# Patient Record
Sex: MALE | Race: WHITE | NOT HISPANIC OR LATINO | URBAN - METROPOLITAN AREA
[De-identification: names, ages, dates, MRNs, and addresses within clinical notes are randomized per-mention and may not be internally consistent; named-entity substitution may affect disease eponyms.]

---

## 2018-06-07 ENCOUNTER — INPATIENT (INPATIENT)
Facility: HOSPITAL | Age: 68
LOS: 7 days | Discharge: HOME | DRG: 698 | End: 2018-06-15
Attending: INTERNAL MEDICINE | Admitting: INTERNAL MEDICINE
Payer: MEDICARE

## 2018-06-07 VITALS
RESPIRATION RATE: 20 BRPM | WEIGHT: 315 LBS | HEART RATE: 75 BPM | DIASTOLIC BLOOD PRESSURE: 68 MMHG | SYSTOLIC BLOOD PRESSURE: 192 MMHG | HEIGHT: 74 IN | OXYGEN SATURATION: 92 % | TEMPERATURE: 98 F

## 2018-06-07 DIAGNOSIS — E87.70 FLUID OVERLOAD, UNSPECIFIED: ICD-10-CM

## 2018-06-07 DIAGNOSIS — E11.618 TYPE 2 DIABETES MELLITUS WITH OTHER DIABETIC ARTHROPATHY: ICD-10-CM

## 2018-06-07 DIAGNOSIS — N17.9 ACUTE KIDNEY FAILURE, UNSPECIFIED: ICD-10-CM

## 2018-06-07 DIAGNOSIS — N40.0 BENIGN PROSTATIC HYPERPLASIA WITHOUT LOWER URINARY TRACT SYMPTOMS: ICD-10-CM

## 2018-06-07 DIAGNOSIS — E87.5 HYPERKALEMIA: ICD-10-CM

## 2018-06-07 DIAGNOSIS — I15.0 RENOVASCULAR HYPERTENSION: ICD-10-CM

## 2018-06-07 DIAGNOSIS — E11.40 TYPE 2 DIABETES MELLITUS WITH DIABETIC NEUROPATHY, UNSPECIFIED: ICD-10-CM

## 2018-06-07 DIAGNOSIS — N39.0 URINARY TRACT INFECTION, SITE NOT SPECIFIED: ICD-10-CM

## 2018-06-07 DIAGNOSIS — E78.2 MIXED HYPERLIPIDEMIA: ICD-10-CM

## 2018-06-07 LAB
ALBUMIN SERPL ELPH-MCNC: 3.2 G/DL — LOW (ref 3.3–5)
ALP SERPL-CCNC: 94 U/L — SIGNIFICANT CHANGE UP (ref 40–120)
ALT FLD-CCNC: 22 U/L — SIGNIFICANT CHANGE UP (ref 12–78)
ANION GAP SERPL CALC-SCNC: 10 MMOL/L — SIGNIFICANT CHANGE UP (ref 5–17)
APPEARANCE UR: CLEAR — SIGNIFICANT CHANGE UP
APTT BLD: 35.8 SEC — SIGNIFICANT CHANGE UP (ref 27.5–37.4)
AST SERPL-CCNC: 21 U/L — SIGNIFICANT CHANGE UP (ref 15–37)
BASOPHILS # BLD AUTO: 0.04 K/UL — SIGNIFICANT CHANGE UP (ref 0–0.2)
BASOPHILS NFR BLD AUTO: 0.8 % — SIGNIFICANT CHANGE UP (ref 0–2)
BILIRUB SERPL-MCNC: 0.5 MG/DL — SIGNIFICANT CHANGE UP (ref 0.2–1.2)
BILIRUB UR-MCNC: NEGATIVE — SIGNIFICANT CHANGE UP
BUN SERPL-MCNC: 60 MG/DL — HIGH (ref 7–23)
CALCIUM SERPL-MCNC: 8.6 MG/DL — SIGNIFICANT CHANGE UP (ref 8.5–10.1)
CHLORIDE SERPL-SCNC: 115 MMOL/L — HIGH (ref 96–108)
CK MB CFR SERPL CALC: 4.8 NG/ML — HIGH (ref 0–3.6)
CO2 SERPL-SCNC: 18 MMOL/L — LOW (ref 22–31)
COLOR SPEC: YELLOW — SIGNIFICANT CHANGE UP
CREAT SERPL-MCNC: 3.1 MG/DL — HIGH (ref 0.5–1.3)
DIFF PNL FLD: NEGATIVE — SIGNIFICANT CHANGE UP
EOSINOPHIL # BLD AUTO: 0.08 K/UL — SIGNIFICANT CHANGE UP (ref 0–0.5)
EOSINOPHIL NFR BLD AUTO: 1.5 % — SIGNIFICANT CHANGE UP (ref 0–6)
GLUCOSE SERPL-MCNC: 83 MG/DL — SIGNIFICANT CHANGE UP (ref 70–99)
GLUCOSE UR QL: NEGATIVE — SIGNIFICANT CHANGE UP
HCT VFR BLD CALC: 41.3 % — SIGNIFICANT CHANGE UP (ref 39–50)
HGB BLD-MCNC: 13.1 G/DL — SIGNIFICANT CHANGE UP (ref 13–17)
IMM GRANULOCYTES NFR BLD AUTO: 0.4 % — SIGNIFICANT CHANGE UP (ref 0–1.5)
INR BLD: 1.32 RATIO — HIGH (ref 0.88–1.16)
KETONES UR-MCNC: NEGATIVE — SIGNIFICANT CHANGE UP
LEUKOCYTE ESTERASE UR-ACNC: ABNORMAL
LYMPHOCYTES # BLD AUTO: 0.49 K/UL — LOW (ref 1–3.3)
LYMPHOCYTES # BLD AUTO: 9.2 % — LOW (ref 13–44)
MCHC RBC-ENTMCNC: 29.2 PG — SIGNIFICANT CHANGE UP (ref 27–34)
MCHC RBC-ENTMCNC: 31.7 GM/DL — LOW (ref 32–36)
MCV RBC AUTO: 92 FL — SIGNIFICANT CHANGE UP (ref 80–100)
MONOCYTES # BLD AUTO: 0.42 K/UL — SIGNIFICANT CHANGE UP (ref 0–0.9)
MONOCYTES NFR BLD AUTO: 7.9 % — SIGNIFICANT CHANGE UP (ref 2–14)
NEUTROPHILS # BLD AUTO: 4.25 K/UL — SIGNIFICANT CHANGE UP (ref 1.8–7.4)
NEUTROPHILS NFR BLD AUTO: 80.2 % — HIGH (ref 43–77)
NITRITE UR-MCNC: NEGATIVE — SIGNIFICANT CHANGE UP
NT-PROBNP SERPL-SCNC: HIGH PG/ML (ref 0–125)
PH UR: 5 — SIGNIFICANT CHANGE UP (ref 5–8)
PLATELET # BLD AUTO: 212 K/UL — SIGNIFICANT CHANGE UP (ref 150–400)
POTASSIUM SERPL-MCNC: 6.1 MMOL/L — HIGH (ref 3.5–5.3)
POTASSIUM SERPL-SCNC: 6.1 MMOL/L — HIGH (ref 3.5–5.3)
PROT SERPL-MCNC: 7.1 G/DL — SIGNIFICANT CHANGE UP (ref 6–8.3)
PROT UR-MCNC: 25 MG/DL
PROTHROM AB SERPL-ACNC: 14.5 SEC — HIGH (ref 9.8–12.7)
RBC # BLD: 4.49 M/UL — SIGNIFICANT CHANGE UP (ref 4.2–5.8)
RBC # FLD: 15.7 % — HIGH (ref 10.3–14.5)
SODIUM SERPL-SCNC: 143 MMOL/L — SIGNIFICANT CHANGE UP (ref 135–145)
SP GR SPEC: 1.02 — SIGNIFICANT CHANGE UP (ref 1.01–1.02)
TROPONIN I SERPL-MCNC: 0.03 NG/ML — SIGNIFICANT CHANGE UP (ref 0.01–0.04)
UROBILINOGEN FLD QL: NEGATIVE — SIGNIFICANT CHANGE UP
WBC # BLD: 5.3 K/UL — SIGNIFICANT CHANGE UP (ref 3.8–10.5)
WBC # FLD AUTO: 5.3 K/UL — SIGNIFICANT CHANGE UP (ref 3.8–10.5)

## 2018-06-07 PROCEDURE — 99291 CRITICAL CARE FIRST HOUR: CPT

## 2018-06-07 PROCEDURE — 93010 ELECTROCARDIOGRAM REPORT: CPT

## 2018-06-07 PROCEDURE — 71046 X-RAY EXAM CHEST 2 VIEWS: CPT | Mod: 26

## 2018-06-07 RX ORDER — DEXTROSE 50 % IN WATER 50 %
25 SYRINGE (ML) INTRAVENOUS ONCE
Qty: 0 | Refills: 0 | Status: DISCONTINUED | OUTPATIENT
Start: 2018-06-07 | End: 2018-06-15

## 2018-06-07 RX ORDER — SODIUM BICARBONATE 1 MEQ/ML
50 SYRINGE (ML) INTRAVENOUS ONCE
Qty: 0 | Refills: 0 | Status: COMPLETED | OUTPATIENT
Start: 2018-06-07 | End: 2018-06-07

## 2018-06-07 RX ORDER — DEXTROSE 50 % IN WATER 50 %
12.5 SYRINGE (ML) INTRAVENOUS ONCE
Qty: 0 | Refills: 0 | Status: DISCONTINUED | OUTPATIENT
Start: 2018-06-07 | End: 2018-06-15

## 2018-06-07 RX ORDER — AMLODIPINE BESYLATE 2.5 MG/1
1 TABLET ORAL
Qty: 0 | Refills: 0 | COMMUNITY

## 2018-06-07 RX ORDER — INSULIN LISPRO 100/ML
VIAL (ML) SUBCUTANEOUS
Qty: 0 | Refills: 0 | Status: DISCONTINUED | OUTPATIENT
Start: 2018-06-07 | End: 2018-06-15

## 2018-06-07 RX ORDER — CEFAZOLIN SODIUM 1 G
1000 VIAL (EA) INJECTION EVERY 12 HOURS
Qty: 0 | Refills: 0 | Status: DISCONTINUED | OUTPATIENT
Start: 2018-06-08 | End: 2018-06-14

## 2018-06-07 RX ORDER — FUROSEMIDE 40 MG
20 TABLET ORAL DAILY
Qty: 0 | Refills: 0 | Status: DISCONTINUED | OUTPATIENT
Start: 2018-06-07 | End: 2018-06-09

## 2018-06-07 RX ORDER — DEXTROSE 50 % IN WATER 50 %
50 SYRINGE (ML) INTRAVENOUS ONCE
Qty: 0 | Refills: 0 | Status: COMPLETED | OUTPATIENT
Start: 2018-06-07 | End: 2018-06-07

## 2018-06-07 RX ORDER — ENOXAPARIN SODIUM 100 MG/ML
30 INJECTION SUBCUTANEOUS DAILY
Qty: 0 | Refills: 0 | Status: DISCONTINUED | OUTPATIENT
Start: 2018-06-07 | End: 2018-06-07

## 2018-06-07 RX ORDER — INSULIN GLARGINE 100 [IU]/ML
10 INJECTION, SOLUTION SUBCUTANEOUS EVERY MORNING
Qty: 0 | Refills: 0 | Status: DISCONTINUED | OUTPATIENT
Start: 2018-06-08 | End: 2018-06-08

## 2018-06-07 RX ORDER — SODIUM CHLORIDE 9 MG/ML
1000 INJECTION, SOLUTION INTRAVENOUS
Qty: 0 | Refills: 0 | Status: DISCONTINUED | OUTPATIENT
Start: 2018-06-07 | End: 2018-06-15

## 2018-06-07 RX ORDER — GLUCAGON INJECTION, SOLUTION 0.5 MG/.1ML
1 INJECTION, SOLUTION SUBCUTANEOUS ONCE
Qty: 0 | Refills: 0 | Status: DISCONTINUED | OUTPATIENT
Start: 2018-06-07 | End: 2018-06-15

## 2018-06-07 RX ORDER — CALCIUM GLUCONATE 100 MG/ML
1 VIAL (ML) INTRAVENOUS ONCE
Qty: 0 | Refills: 0 | Status: COMPLETED | OUTPATIENT
Start: 2018-06-07 | End: 2018-06-07

## 2018-06-07 RX ORDER — ACETAMINOPHEN 500 MG
650 TABLET ORAL EVERY 6 HOURS
Qty: 0 | Refills: 0 | Status: DISCONTINUED | OUTPATIENT
Start: 2018-06-07 | End: 2018-06-15

## 2018-06-07 RX ORDER — DEXTROSE 50 % IN WATER 50 %
15 SYRINGE (ML) INTRAVENOUS ONCE
Qty: 0 | Refills: 0 | Status: DISCONTINUED | OUTPATIENT
Start: 2018-06-07 | End: 2018-06-15

## 2018-06-07 RX ORDER — INSULIN HUMAN 100 [IU]/ML
5 INJECTION, SOLUTION SUBCUTANEOUS ONCE
Qty: 0 | Refills: 0 | Status: COMPLETED | OUTPATIENT
Start: 2018-06-07 | End: 2018-06-07

## 2018-06-07 RX ORDER — SODIUM CHLORIDE 9 MG/ML
1000 INJECTION INTRAMUSCULAR; INTRAVENOUS; SUBCUTANEOUS
Qty: 0 | Refills: 0 | Status: DISCONTINUED | OUTPATIENT
Start: 2018-06-07 | End: 2018-06-08

## 2018-06-07 RX ORDER — CEFAZOLIN SODIUM 1 G
1000 VIAL (EA) INJECTION ONCE
Qty: 0 | Refills: 0 | Status: COMPLETED | OUTPATIENT
Start: 2018-06-07 | End: 2018-06-07

## 2018-06-07 RX ORDER — ENOXAPARIN SODIUM 100 MG/ML
40 INJECTION SUBCUTANEOUS DAILY
Qty: 0 | Refills: 0 | Status: DISCONTINUED | OUTPATIENT
Start: 2018-06-07 | End: 2018-06-15

## 2018-06-07 RX ADMIN — Medication 50 MILLIEQUIVALENT(S): at 21:42

## 2018-06-07 RX ADMIN — Medication 20 MILLIGRAM(S): at 23:12

## 2018-06-07 RX ADMIN — Medication 100 MILLIGRAM(S): at 23:10

## 2018-06-07 RX ADMIN — INSULIN HUMAN 5 UNIT(S): 100 INJECTION, SOLUTION SUBCUTANEOUS at 21:42

## 2018-06-07 RX ADMIN — Medication 200 GRAM(S): at 21:41

## 2018-06-07 RX ADMIN — Medication 50 MILLILITER(S): at 21:42

## 2018-06-07 NOTE — ED PROVIDER NOTE - NS ED ROS FT
GEN: no fever, no chills, no weakness  HENT: no eye pain, no visual changes, no ear pain, no visual or hearing changes, no sore throat, no swelling or neck pain  CV: no chest pain, no palpitations, no dizziness, +swelling  RESP: +coughing, no sob, no IWOB, +MEHTA  GI: no abd pain, no distension, no nausea, no vomiting, no diarrhea, no constipation  : no dysuria,  no frequency, no hematuria, no discharge, no flank pain, +inability to urinate  MUSCULOSKELETAL: no myalgia, no arthralgia, no joint swelling, no bruising   SKIN: no rash, no wounds, no itching  NEURO: no change in mentation, no visual changes, no HA, no focal weakness, no trouble speaking, no gait abnormalities, no dizziness  PSYCH: no suidical ideation, no homicidal ideation, no depression, no anxiety, no hallucinations

## 2018-06-07 NOTE — ED PROVIDER NOTE - OBJECTIVE STATEMENT
69yo M h/o CRI, HTN, DM p/w productive cough w/ white sputum associated w/ bilateral LE swelling, exertional dyspnea and decreased urination x 2-3 days. denies f/c/n/v/d/cp/flank pain. reports angiogram 2yrs prior w/o active disease. no recent travel.

## 2018-06-07 NOTE — ED ADULT NURSE REASSESSMENT NOTE - NS ED NURSE REASSESS COMMENT FT1
bladder scanned, no more than 100 ml present in bladder.  lower extremity edema present, patient states he hasn't had anything to drink since the am

## 2018-06-07 NOTE — H&P ADULT - PROBLEM SELECTOR PROBLEM 8
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

## 2018-06-07 NOTE — H&P ADULT - ASSESSMENT
67 yo m ,  ,plus kids , lives with wife , works from home , has hx of hypertension , osteoarthritis , and hyperlipidemia , and ckd stage 3 , and DM T2, and hx of foot surgery , and chronic edema and lymphedema in legs , and presents with a few weeks of increasing edema and poor dm control and shortness of breath and now with inability to void , and increased weakness,   denies cp , fever, chills , nausea a, vomiting , head aches ,

## 2018-06-07 NOTE — ED PROVIDER NOTE - MEDICAL DECISION MAKING DETAILS
69yo M h/o CRI, HTN, DM p/w productive cough w/ white sputum associated w/ bilateral LE swelling, exertional dyspnea and decreased urination x 2-3 days. denies f/c/n/v/d/cp/flank pain. reports angiogram 2yrs prior w/o active disease. no recent travel. 67yo M h/o CRI, HTN, DM p/w productive cough w/ white sputum associated w/ bilateral LE swelling, exertional dyspnea and decreased urination x 2-3 days. denies f/c/n/v/d/cp/flank pain. reports angiogram 2yrs prior w/o active disease. no recent travel.  EKG unchanged from prior 67yo M h/o CRI, HTN, DM p/w productive cough w/ white sputum associated w/ bilateral LE swelling, exertional dyspnea and decreased urination x 2-3 days. denies f/c/n/v/d/cp/flank pain. reports angiogram 2yrs prior w/o active disease. no recent travel.  EKG unchanged from prior, no peaked T wave,   labs sig for elevated BNP, ARF w/ hyperkalemia, given insulin, d50, ca, bicarb,   pt admitted for further eval and mgmt

## 2018-06-07 NOTE — ED PROVIDER NOTE - CARE PLAN
Principal Discharge DX:	Acute renal failure, unspecified acute renal failure type  Secondary Diagnosis:	Hyperkalemia  Secondary Diagnosis:	Hypervolemia, unspecified hypervolemia type

## 2018-06-07 NOTE — ED PROVIDER NOTE - CRITICAL CARE INDICATION, MLM
patient was critically ill... Patient was critically ill with a high probability of imminent or life threatening deterioration. ARF, fluid overload w/ hyperkalemia

## 2018-06-07 NOTE — ED ADULT NURSE NOTE - CAS EDN DISCHARGE ASSESSMENT
Awake/Alert and oriented to person, place and time/Patient baseline mental status/Dressing clean and dry

## 2018-06-07 NOTE — ED PROVIDER NOTE - PHYSICAL EXAMINATION
GEN: awake, alert, well appearing, NAD   HENT: atraumatic, normocephalic, FRANCISCO, EOMI, no midline instability, oropharynx w/o erythema or exudates, no lymphadenopathy  CV: normal rate and rhythm, S1, S2, no MRG, equal pulses throughout, no JVD, 3+ pitting edema to bilateral LE extending up to lower abdomen  RESP: no distress, no IWOB, no retraction, rhonchi noted bilaterally   ABD: soft, nontender, nondistended, no rebound, no guarding, normoactive bowel sounds, no organomegally  MUSCULOSKELETAL: strenght 5/5 x 4, full range of motion, CMS intact   SKIN: normal color, no turgor, no wounds or rash   NEURO: Awake alert oriented x 3, no facial asymmetry, no slurred speech, no pronator drift, moving all extremities  PSYCH: no suicial ideation, no homicidal ideation, no depression, no anxiety, no hallucination

## 2018-06-07 NOTE — ED PROVIDER NOTE - CRITICAL CARE PROVIDED
direct patient care (not related to procedure)/additional history taking/interpretation of diagnostic studies/documentation

## 2018-06-07 NOTE — ED ADULT NURSE NOTE - OBJECTIVE STATEMENT
patient a/o x 4 with a calm affect c/o generalized swelling, and cough.  patient adds he hasn't been drinking fluids and has not been producing a normal amount of urine.  patient pending results of initial lab work.  placed on continuous cardiac monitoring, RN will continue to monitor patient closely.

## 2018-06-08 DIAGNOSIS — I10 ESSENTIAL (PRIMARY) HYPERTENSION: ICD-10-CM

## 2018-06-08 LAB
ANION GAP SERPL CALC-SCNC: 7 MMOL/L — SIGNIFICANT CHANGE UP (ref 5–17)
BASOPHILS # BLD AUTO: 0.05 K/UL — SIGNIFICANT CHANGE UP (ref 0–0.2)
BASOPHILS NFR BLD AUTO: 1.2 % — SIGNIFICANT CHANGE UP (ref 0–2)
BUN SERPL-MCNC: 60 MG/DL — HIGH (ref 7–23)
CALCIUM SERPL-MCNC: 8.2 MG/DL — LOW (ref 8.5–10.1)
CHLORIDE SERPL-SCNC: 114 MMOL/L — HIGH (ref 96–108)
CK SERPL-CCNC: 176 U/L — SIGNIFICANT CHANGE UP (ref 26–308)
CO2 SERPL-SCNC: 23 MMOL/L — SIGNIFICANT CHANGE UP (ref 22–31)
CREAT SERPL-MCNC: 3.1 MG/DL — HIGH (ref 0.5–1.3)
EOSINOPHIL # BLD AUTO: 0.09 K/UL — SIGNIFICANT CHANGE UP (ref 0–0.5)
EOSINOPHIL NFR BLD AUTO: 2.1 % — SIGNIFICANT CHANGE UP (ref 0–6)
GLUCOSE SERPL-MCNC: 66 MG/DL — LOW (ref 70–99)
HBA1C BLD-MCNC: 5.5 % — SIGNIFICANT CHANGE UP (ref 4–5.6)
HCT VFR BLD CALC: 36.7 % — LOW (ref 39–50)
HGB BLD-MCNC: 11.5 G/DL — LOW (ref 13–17)
IMM GRANULOCYTES NFR BLD AUTO: 0.2 % — SIGNIFICANT CHANGE UP (ref 0–1.5)
LYMPHOCYTES # BLD AUTO: 0.37 K/UL — LOW (ref 1–3.3)
LYMPHOCYTES # BLD AUTO: 8.6 % — LOW (ref 13–44)
MCHC RBC-ENTMCNC: 29 PG — SIGNIFICANT CHANGE UP (ref 27–34)
MCHC RBC-ENTMCNC: 31.3 GM/DL — LOW (ref 32–36)
MCV RBC AUTO: 92.7 FL — SIGNIFICANT CHANGE UP (ref 80–100)
MONOCYTES # BLD AUTO: 0.37 K/UL — SIGNIFICANT CHANGE UP (ref 0–0.9)
MONOCYTES NFR BLD AUTO: 8.6 % — SIGNIFICANT CHANGE UP (ref 2–14)
NEUTROPHILS # BLD AUTO: 3.4 K/UL — SIGNIFICANT CHANGE UP (ref 1.8–7.4)
NEUTROPHILS NFR BLD AUTO: 79.3 % — HIGH (ref 43–77)
NRBC # BLD: 0 /100 WBCS — SIGNIFICANT CHANGE UP (ref 0–0)
PLATELET # BLD AUTO: 214 K/UL — SIGNIFICANT CHANGE UP (ref 150–400)
POTASSIUM SERPL-MCNC: 6 MMOL/L — HIGH (ref 3.5–5.3)
POTASSIUM SERPL-SCNC: 6 MMOL/L — HIGH (ref 3.5–5.3)
RBC # BLD: 3.96 M/UL — LOW (ref 4.2–5.8)
RBC # FLD: 15.7 % — HIGH (ref 10.3–14.5)
SODIUM SERPL-SCNC: 144 MMOL/L — SIGNIFICANT CHANGE UP (ref 135–145)
T3 SERPL-MCNC: 70 NG/DL — LOW (ref 80–200)
T4 AB SER-ACNC: 5.4 UG/DL — SIGNIFICANT CHANGE UP (ref 4.6–12)
TSH SERPL-MCNC: 2.04 UIU/ML — SIGNIFICANT CHANGE UP (ref 0.36–3.74)
WBC # BLD: 4.29 K/UL — SIGNIFICANT CHANGE UP (ref 3.8–10.5)
WBC # FLD AUTO: 4.29 K/UL — SIGNIFICANT CHANGE UP (ref 3.8–10.5)

## 2018-06-08 PROCEDURE — 93010 ELECTROCARDIOGRAM REPORT: CPT

## 2018-06-08 PROCEDURE — 76775 US EXAM ABDO BACK WALL LIM: CPT | Mod: 26

## 2018-06-08 RX ORDER — SODIUM POLYSTYRENE SULFONATE 4.1 MEQ/G
30 POWDER, FOR SUSPENSION ORAL ONCE
Qty: 0 | Refills: 0 | Status: COMPLETED | OUTPATIENT
Start: 2018-06-08 | End: 2018-06-08

## 2018-06-08 RX ORDER — ISOSORBIDE DINITRATE 5 MG/1
40 TABLET ORAL DAILY
Qty: 0 | Refills: 0 | Status: DISCONTINUED | OUTPATIENT
Start: 2018-06-08 | End: 2018-06-08

## 2018-06-08 RX ORDER — AMLODIPINE BESYLATE 2.5 MG/1
5 TABLET ORAL DAILY
Qty: 0 | Refills: 0 | Status: DISCONTINUED | OUTPATIENT
Start: 2018-06-08 | End: 2018-06-08

## 2018-06-08 RX ORDER — ATORVASTATIN CALCIUM 80 MG/1
20 TABLET, FILM COATED ORAL AT BEDTIME
Qty: 0 | Refills: 0 | Status: DISCONTINUED | OUTPATIENT
Start: 2018-06-08 | End: 2018-06-15

## 2018-06-08 RX ORDER — LABETALOL HCL 100 MG
10 TABLET ORAL ONCE
Qty: 0 | Refills: 0 | Status: COMPLETED | OUTPATIENT
Start: 2018-06-08 | End: 2018-06-08

## 2018-06-08 RX ORDER — ASPIRIN/CALCIUM CARB/MAGNESIUM 324 MG
81 TABLET ORAL DAILY
Qty: 0 | Refills: 0 | Status: DISCONTINUED | OUTPATIENT
Start: 2018-06-08 | End: 2018-06-15

## 2018-06-08 RX ORDER — CALCIUM GLUCONATE 100 MG/ML
1 VIAL (ML) INTRAVENOUS ONCE
Qty: 0 | Refills: 0 | Status: DISCONTINUED | OUTPATIENT
Start: 2018-06-08 | End: 2018-06-08

## 2018-06-08 RX ORDER — DEXTROSE 50 % IN WATER 50 %
15 SYRINGE (ML) INTRAVENOUS ONCE
Qty: 0 | Refills: 0 | Status: COMPLETED | OUTPATIENT
Start: 2018-06-08 | End: 2018-06-08

## 2018-06-08 RX ORDER — ISOSORBIDE DINITRATE 5 MG/1
20 TABLET ORAL THREE TIMES A DAY
Qty: 0 | Refills: 0 | Status: DISCONTINUED | OUTPATIENT
Start: 2018-06-08 | End: 2018-06-14

## 2018-06-08 RX ORDER — SODIUM CHLORIDE 9 MG/ML
1000 INJECTION INTRAMUSCULAR; INTRAVENOUS; SUBCUTANEOUS
Qty: 0 | Refills: 0 | Status: DISCONTINUED | OUTPATIENT
Start: 2018-06-08 | End: 2018-06-08

## 2018-06-08 RX ORDER — ATORVASTATIN CALCIUM 80 MG/1
10 TABLET, FILM COATED ORAL AT BEDTIME
Qty: 0 | Refills: 0 | Status: DISCONTINUED | OUTPATIENT
Start: 2018-06-08 | End: 2018-06-08

## 2018-06-08 RX ORDER — HYDRALAZINE HCL 50 MG
50 TABLET ORAL EVERY 6 HOURS
Qty: 0 | Refills: 0 | Status: DISCONTINUED | OUTPATIENT
Start: 2018-06-08 | End: 2018-06-14

## 2018-06-08 RX ORDER — HYDRALAZINE HCL 50 MG
10 TABLET ORAL ONCE
Qty: 0 | Refills: 0 | Status: COMPLETED | OUTPATIENT
Start: 2018-06-08 | End: 2018-06-08

## 2018-06-08 RX ORDER — AMLODIPINE BESYLATE 2.5 MG/1
10 TABLET ORAL DAILY
Qty: 0 | Refills: 0 | Status: DISCONTINUED | OUTPATIENT
Start: 2018-06-08 | End: 2018-06-09

## 2018-06-08 RX ORDER — HYDRALAZINE HCL 50 MG
25 TABLET ORAL ONCE
Qty: 0 | Refills: 0 | Status: COMPLETED | OUTPATIENT
Start: 2018-06-08 | End: 2018-06-08

## 2018-06-08 RX ORDER — HYDRALAZINE HCL 50 MG
25 TABLET ORAL THREE TIMES A DAY
Qty: 0 | Refills: 0 | Status: DISCONTINUED | OUTPATIENT
Start: 2018-06-08 | End: 2018-06-08

## 2018-06-08 RX ADMIN — Medication 10 MILLIGRAM(S): at 20:50

## 2018-06-08 RX ADMIN — SODIUM CHLORIDE 50 MILLILITER(S): 9 INJECTION INTRAMUSCULAR; INTRAVENOUS; SUBCUTANEOUS at 02:29

## 2018-06-08 RX ADMIN — SODIUM POLYSTYRENE SULFONATE 30 GRAM(S): 4.1 POWDER, FOR SUSPENSION ORAL at 13:15

## 2018-06-08 RX ADMIN — Medication 100 MILLIGRAM(S): at 06:08

## 2018-06-08 RX ADMIN — SODIUM POLYSTYRENE SULFONATE 30 GRAM(S): 4.1 POWDER, FOR SUSPENSION ORAL at 17:30

## 2018-06-08 RX ADMIN — Medication 15 GRAM(S): at 07:44

## 2018-06-08 RX ADMIN — Medication 25 MILLIGRAM(S): at 13:10

## 2018-06-08 RX ADMIN — Medication 25 MILLIGRAM(S): at 01:46

## 2018-06-08 RX ADMIN — Medication 20 MILLIGRAM(S): at 06:08

## 2018-06-08 RX ADMIN — Medication 10 MILLIGRAM(S): at 02:28

## 2018-06-08 RX ADMIN — Medication 50 MILLIGRAM(S): at 17:30

## 2018-06-08 RX ADMIN — Medication 50 MILLIGRAM(S): at 23:43

## 2018-06-08 RX ADMIN — ENOXAPARIN SODIUM 40 MILLIGRAM(S): 100 INJECTION SUBCUTANEOUS at 11:16

## 2018-06-08 RX ADMIN — Medication 10 MILLIGRAM(S): at 21:45

## 2018-06-08 RX ADMIN — Medication 25 MILLIGRAM(S): at 06:08

## 2018-06-08 RX ADMIN — ISOSORBIDE DINITRATE 20 MILLIGRAM(S): 5 TABLET ORAL at 23:43

## 2018-06-08 RX ADMIN — AMLODIPINE BESYLATE 10 MILLIGRAM(S): 2.5 TABLET ORAL at 17:00

## 2018-06-08 RX ADMIN — Medication 81 MILLIGRAM(S): at 17:00

## 2018-06-08 RX ADMIN — ATORVASTATIN CALCIUM 20 MILLIGRAM(S): 80 TABLET, FILM COATED ORAL at 21:27

## 2018-06-08 RX ADMIN — Medication 100 MILLIGRAM(S): at 17:58

## 2018-06-08 NOTE — CHART NOTE - NSCHARTNOTEFT_GEN_A_CORE
Called by RN for pt requesting to leave AMA. States he is only getting 25% of his prescribed medications. Was unable to reach his pain management doctor but checked pharmacy and his correct dose is prescribed. Pt states if he does not get more oxycodone and valium he wants to leave. Pt extremely hostile and threatening, stating he will punch Dr. Stern if he sees him tomorrow. Pt advised he can leave AMA now or in am to assure safe discharge. He is currently making arrangements to leave tonight Called by RN for hypertension. Pt seen and evaluated Called by RN for hypertension. BP taken manually was 204/82. Pt seen and evaluated. Pt asymptomatic, denies CP, SOB, lightheadedness, HA, dizziness, vision changes. Cardio consult and earlier notes appreciated.     Vital Signs Last 24 Hrs  T(C): 36.9 (08 Jun 2018 19:54), Max: 36.9 (08 Jun 2018 08:53)  T(F): 98.4 (08 Jun 2018 19:54), Max: 98.4 (08 Jun 2018 08:53)  HR: 88 (08 Jun 2018 19:54) (60 - 88)  BP: 204/82 (08 Jun 2018 20:41) (150/82 - 216/71)  BP(mean): --  RR: 17 (08 Jun 2018 19:54) (17 - 18)  SpO2: 97% (08 Jun 2018 19:54) (93% - 97%)    General: Well developed obese male, well nourished, NAD  HEENT: NCAT, PERRLA, EOMI bl, moist mucous membranes   Neck: Supple, nontender, no mass  Neurology: A&Ox3, nonfocal  Respiratory: CTA B/L, No W/R/R  CV: RRR, +S1/S2, no murmurs, rubs or gallops  Abdominal: Soft, NT, ND +BSx4  Extremities: No C/C/E, + peripheral pulses    68 year old male admitted for chronic edema and lymphedema in legs, presented with increasing edema, poor dm control and shortness of breath, now with elevated BP and sustained irregular heart rhythm  - Pt asymptomatic. Cardio note appreciated, avoid BB due to bradycardia. had already Received amlodipine 10mg PO  - hydralazine 10mg IV push performed  - Call placed to Dr. Washburn (covering Dr. Lo), awaiting call back  - will continue to monitor

## 2018-06-08 NOTE — CONSULT NOTE ADULT - ASSESSMENT
67 yo m ,  ,plus kids , lives with wife , works from home , has hx of hypertension , osteoarthritis , and hyperlipidemia , and ckd stage 3 , and DM T2, and hx of foot surgery , and chronic edema and lymphedema in legs , and presents with a few weeks of increasing edema and poor dm control and shortness of breath and now with inability to void , and increased weakness, now with venkata and ckd

## 2018-06-08 NOTE — CHART NOTE - NSCHARTNOTEFT_GEN_A_CORE
67 yo m pmh hypertension , osteoarthritis, hyperlipidemia , ckd stage 3 , DM II, chronic edema and lymphedema in legs , presented with increasing edema, poor dm control and shortness of breath. Pt was admitted to the floor. Found to be hypertensive systolic 190-200. Pt had taken his bp meds in the am including his losartan, amlodipine and verapamil but had missed his hydralazine that he takes TID due to presenting to the ED. Pt is asymptomatic. all ros negative     Vital Signs Last 24 Hrs  T(C): 36.6 (2018 02:06), Max: 36.9 (2018 18:24)  T(F): 97.8 (2018 02:06), Max: 98.4 (2018 18:24)  HR: 71 (2018 02:06) (71 - 86)  BP: 190/78 (2018 02:06) (174/74 - 192/76)  BP(mean): --  RR: 18 (2018 00:30) (18 - 20)  SpO2: 95% (2018 02:06) (92% - 96%)                          13.1   5.30  )-----------( 212      ( 2018 19:59 )             41.3     2018 19:59    143    |  115    |  60     ----------------------------<  83     6.1     |  18     |  3.10     Ca    8.6        2018 19:59    TPro  7.1    /  Alb  3.2    /  TBili  0.5    /  DBili  x      /  AST  21     /  ALT  22     /  AlkPhos  94     2018 19:59    LIVER FUNCTIONS - ( 2018 19:59 )  Alb: 3.2 g/dL / Pro: 7.1 g/dL / ALK PHOS: 94 U/L / ALT: 22 U/L / AST: 21 U/L / GGT: x           PT/INR - ( 2018 19:59 )   PT: 14.5 sec;   INR: 1.32 ratio         PTT - ( 2018 19:59 )  PTT:35.8 sec  CAPILLARY BLOOD GLUCOSE        CARDIAC MARKERS ( 2018 19:59 )  .025 ng/mL / x     / x     / x     / 4.8 ng/mL      Urinalysis Basic - ( 2018 20:53 )    Color: Yellow / Appearance: Clear / S.020 / pH: x  Gluc: x / Ketone: Negative  / Bili: Negative / Urobili: Negative   Blood: x / Protein: 25 mg/dL / Nitrite: Negative   Leuk Esterase: Moderate / RBC: 3-5 /HPF / WBC 11-25   Sq Epi: x / Non Sq Epi: Few / Bacteria: Few    General: obese, NAD resting comfortably in bed  HEENT: NCAT, EOMI  Neurology: A&Ox3   Respiratory: CTA B/L, No W/R/R  CV: RRR, +S1/S2, no murmurs, rubs or gallops  Skin: warm, dry, normal color    A/P: 67 yo m pmh as above who presented with CINDY on CKD, found to be hypertensive, likely due to missed doses of home bp meds   - given hydralazine x 1 , no improvement in BP at 1 hour   - given labetalol 10mg IV x 1.  following labetalol pt had episode of bradycardia - HR 40's-50's while sleeping , asymptomatic. Only one lead available to review on telemetry- appears to be heart block , EKG obtained and bradycardia had resolved.  -  Will continue to monitor on tele. if pt with further episodes of bradycardia will give glucagon. D/w ICU PA.  - IVF initially started for CINDY, will hold until pts bp better controlled.

## 2018-06-08 NOTE — CHART NOTE - NSCHARTNOTEFT_GEN_A_CORE
Called by RN and tele tech as patient's heart rhythm seemed "unusual". As per tele tech, patient had prolonged 1st degree heart block with increasing PVCs, dropped beats and bradycardia. Patient seen and examined at bedside. Patient states he feels well, no CP, SOB, palpitations.         T(C): 36.8 (18 @ 16:12), Max: 36.9 (18 @ 18:24)  HR: 60 (18 @ 16:12) (60 - 86)  BP: 180/78 (18 @ 16:23) (151/76 - 216/71)  RR: 18 (18 @ 16:12) (18 - 20)  SpO2: 94% (18 @ 16:12) (92% - 96%)  Wt(kg): --    Physical :  Gen- NAD, ncat  Cardio - s+1,s+2, irregular rhythm  Lung - dec breath sounds B/L  Abdomen- +BS, NT/ND, no guarding, no rebound, no masses, + morbid obesity  Ext- + pitting edema B/L LE, 2+ pulses b/l  Neuro- CN grossly intact, nonfocal    LABS:                        11.5   4.29  )-----------( 214      ( 2018 06:24 )             36.7         144  |  114<H>  |  60<H>  ----------------------------<  66<L>  6.0<H>   |  23  |  3.10<H>    Ca    8.2<L>      2018 06:24    TPro  7.1  /  Alb  3.2<L>  /  TBili  0.5  /  DBili  x   /  AST  21  /  ALT  22  /  AlkPhos  94  06-07    PT/INR - ( 2018 19:59 )   PT: 14.5 sec;   INR: 1.32 ratio         PTT - ( 2018 19:59 )  PTT:35.8 sec  Urinalysis Basic - ( 2018 20:53 )    Color: Yellow / Appearance: Clear / S.020 / pH: x  Gluc: x / Ketone: Negative  / Bili: Negative / Urobili: Negative   Blood: x / Protein: 25 mg/dL / Nitrite: Negative   Leuk Esterase: Moderate / RBC: 3-5 /HPF / WBC 11-25   Sq Epi: x / Non Sq Epi: Few / Bacteria: Few        CARDIAC MARKERS ( 2018 06:24 )  x     / x     / 176 U/L / x     / x      CARDIAC MARKERS ( 2018 19:59 )  .025 ng/mL / x     / x     / x     / 4.8 ng/mL        Assessment/Plan  68yMale admitted for chronic edema and lymphedema in legs, presented with increasing edema, poor dm control and shortness of breath, now with elevated BP and irregular heart rhythm  -EKG Stat- showed irregular sinus rhythm, 1st degree AV block, no significant ST changes  -As per chart review patient's K- 6.0, received Kayexalate x1, but did not have BM  -Patient's BP also elevated- 216/71 (electronic), but was 180/78 (manual)  -Spoke with Cardiologist, Dr. Lo, states he is aware of the unusual rhythm, and patient has very prolonged [place] intervals. Dr. Lo stated the electrolyte imbalance could be contributing to his unusual rhythm and bradycardia. Recommended another dose of kayexalate 30mg STAT, amlodipine 10mg STAT, ASA 81mg STAT, EKG in AM. Dr. Lo also stated if patient has episodes of bradycardia or long pauses overnight, patient might need to be transferred for PPM placement.  -Will sign out to night team  -Will follow, RN to call with any changes.

## 2018-06-08 NOTE — PROGRESS NOTE ADULT - ASSESSMENT
69 yo m ,  ,plus kids , lives with wife , works from home , has hx of hypertension , osteoarthritis , and hyperlipidemia , and ckd stage 3 , and DM T2, and hx of foot surgery , and chronic edema and lymphedema in legs , and presents with a few weeks of increasing edema and poor dm control and shortness of breath and now with inability to void , and increased weakness,   denies cp , fever, chills , nausea a, vomiting , head aches ,   patient admitted for CINDY on CKD , and hyperkalemia , and diabetes mellitus type 2 , and edema and fluid retention , on lasix , nephro to follow ,   on IV abx for ? uti ,   low sugar this am , got glucose , , will adjust insulin

## 2018-06-08 NOTE — CONSULT NOTE ADULT - SUBJECTIVE AND OBJECTIVE BOX
[facility] Cardiology    CHIEF COMPLAINT: Patient is a 68y old  Male who presents with a chief complaint of Increased Swelling in bilateral lower extremities (08 Jun 2018 00:17)      HPI:  67 yo m ,  ,plus kids , lives with wife , works from home , has hx of hypertension , osteoarthritis , and hyperlipidemia , and ckd stage 3 , and DM T2, and hx of foot surgery , and chronic edema and lymphedema in legs , and presents with a few weeks of increasing edema and poor dm control and shortness of breath and now with inability to void , and increased weakness,   denies cp , fever, chills , nausea a, vomiting , head aches , (07 Jun 2018 21:33)      PAST MEDICAL & SURGICAL HISTORY:  BPH (benign prostatic hyperplasia)  Hyperlipemia  Hypertension  No significant past surgical history    SOCIAL HISTORY:     FAMILY HISTORY:      MEDICATIONS  (STANDING):  ceFAZolin   IVPB 1000 milliGRAM(s) IV Intermittent every 12 hours  dextrose 5%. 1000 milliLiter(s) (50 mL/Hr) IV Continuous <Continuous>  dextrose 50% Injectable 12.5 Gram(s) IV Push once  dextrose 50% Injectable 25 Gram(s) IV Push once  dextrose 50% Injectable 25 Gram(s) IV Push once  enoxaparin Injectable 40 milliGRAM(s) SubCutaneous daily  furosemide   Injectable 20 milliGRAM(s) IV Push daily  hydrALAZINE 25 milliGRAM(s) Oral three times a day  insulin lispro (HumaLOG) corrective regimen sliding scale   SubCutaneous three times a day before meals    MEDICATIONS  (PRN):  acetaminophen   Tablet. 650 milliGRAM(s) Oral every 6 hours PRN Mild Pain (1 - 3)  dextrose 40% Gel 15 Gram(s) Oral once PRN Blood Glucose LESS THAN 70 milliGRAM(s)/deciliter  glucagon  Injectable 1 milliGRAM(s) IntraMuscular once PRN Glucose LESS THAN 70 milligrams/deciliter  guaiFENesin   Syrup  (Sugar-Free) 100 milliGRAM(s) Oral every 6 hours PRN Cough      Allergies    adhesives (Rash)  No Known Drug Allergies    Intolerances      REVIEW OF SYSTEMS:  CONSTITUTIONAL: No weakness, no fevers   EYES/ENT: No visual changes  NECK: No pain or stiffness  RESPIRATORY: No shortness of breath  CARDIOVASCULAR: No chest pain or palpitations  GASTROINTESTINAL: No abdominal pain  GENITOURINARY: No hematuria  NEUROLOGICAL: No weakness  SKIN: No rash  All other review of systems is negative unless indicated above    VITAL SIGNS:   Vital Signs Last 24 Hrs  T(C): 36.9 (08 Jun 2018 08:53), Max: 36.9 (07 Jun 2018 18:24)  T(F): 98.4 (08 Jun 2018 08:53), Max: 98.4 (07 Jun 2018 18:24)  HR: 72 (08 Jun 2018 08:53) (60 - 86)  BP: 175/69 (08 Jun 2018 08:53) (151/76 - 192/76)  BP(mean): --  RR: 18 (08 Jun 2018 08:53) (18 - 20)  SpO2: 95% (08 Jun 2018 08:53) (92% - 96%)  I&O's Summary    PHYSICAL EXAM:  Constitutional: NAD  Neurological: Alert and oriented  HEENT: EOMI, no JVD  Cardiovascular: S1 and S2  Pulmonary: breath sounds bilaterally  Gastrointestinal: Bowel Sounds present, soft, nontender  Ext: peripheral edema  Skin: No rashes, No cyanosis.  Psych:  Mood & affect appropriate   LABS: All Labs Reviewed:                        11.5   4.29  )-----------( 214      ( 08 Jun 2018 06:24 )             36.7     06-08    144  |  114<H>  |  60<H>  ----------------------------<  66<L>  6.0<H>   |  23  |  3.10<H>    Ca    8.2<L>      08 Jun 2018 06:24    TPro  7.1  /  Alb  3.2<L>  /  TBili  0.5  /  DBili  x   /  AST  21  /  ALT  22  /  AlkPhos  94  06-07    PT/INR - ( 07 Jun 2018 19:59 )   PT: 14.5 sec;   INR: 1.32 ratio         PTT - ( 07 Jun 2018 19:59 )  PTT:35.8 sec  CARDIAC MARKERS ( 08 Jun 2018 06:24 )  x     / x     / 176 U/L / x     / x      CARDIAC MARKERS ( 07 Jun 2018 19:59 )  .025 ng/mL / x     / x     / x     / 4.8 ng/mL      Blood Culture:   06-07 @ 19:59  Pro Bnp [id number]    06-08 @ 10:09  TSH: 2.04    RADIOLOGY/EKG: Diagnosis Line Sinus rhythm with 1st degree AV block  Poor R Wave Progression     2D ECHO: repeat pending [facility]    CHIEF COMPLAINT: Patient is a 68y old  Male who presents with a chief complaint of Increased Swelling in bilateral lower extremities (08 Jun 2018 00:17)      HPI:  67 yo m ,  ,plus kids , lives with wife , works from home , has hx of hypertension , osteoarthritis , and hyperlipidemia , and ckd stage 3 , and DM T2, and hx of foot surgery , and chronic edema and lymphedema in legs , and presents with a few weeks of increasing edema and poor dm control and shortness of breath and now with inability to void , and increased weakness,   denies cp , fever, chills , nausea a, vomiting , head aches , (07 Jun 2018 21:33)    ECHO 8/5/16; TDS, probably normal EF, severe LVH, LAE  Lexiscan: 8/2016: Abnormal  CARDIAC CATH: 8/16/16: D2 50%, otherwise normal coronaries    PAST MEDICAL & SURGICAL HISTORY:  BPH (benign prostatic hyperplasia)  Hyperlipemia  Hypertension  No significant past surgical history    SOCIAL HISTORY:     FAMILY HISTORY:      MEDICATIONS  (STANDING):  ceFAZolin   IVPB 1000 milliGRAM(s) IV Intermittent every 12 hours  dextrose 5%. 1000 milliLiter(s) (50 mL/Hr) IV Continuous <Continuous>  dextrose 50% Injectable 12.5 Gram(s) IV Push once  dextrose 50% Injectable 25 Gram(s) IV Push once  dextrose 50% Injectable 25 Gram(s) IV Push once  enoxaparin Injectable 40 milliGRAM(s) SubCutaneous daily  furosemide   Injectable 20 milliGRAM(s) IV Push daily  hydrALAZINE 25 milliGRAM(s) Oral three times a day  insulin lispro (HumaLOG) corrective regimen sliding scale   SubCutaneous three times a day before meals    MEDICATIONS  (PRN):  acetaminophen   Tablet. 650 milliGRAM(s) Oral every 6 hours PRN Mild Pain (1 - 3)  dextrose 40% Gel 15 Gram(s) Oral once PRN Blood Glucose LESS THAN 70 milliGRAM(s)/deciliter  glucagon  Injectable 1 milliGRAM(s) IntraMuscular once PRN Glucose LESS THAN 70 milligrams/deciliter  guaiFENesin   Syrup  (Sugar-Free) 100 milliGRAM(s) Oral every 6 hours PRN Cough      Allergies    adhesives (Rash)  No Known Drug Allergies    Intolerances      REVIEW OF SYSTEMS:  CONSTITUTIONAL: No weakness, no fevers   EYES/ENT: No visual changes  NECK: No pain or stiffness  RESPIRATORY: shortness of breath  CARDIOVASCULAR: No chest pain or palpitations  GASTROINTESTINAL: No abdominal pain  GENITOURINARY: No hematuria  NEUROLOGICAL: No weakness  SKIN: LE edema 3+  All other review of systems is negative unless indicated above    VITAL SIGNS:   Vital Signs Last 24 Hrs  T(C): 36.9 (08 Jun 2018 08:53), Max: 36.9 (07 Jun 2018 18:24)  T(F): 98.4 (08 Jun 2018 08:53), Max: 98.4 (07 Jun 2018 18:24)  HR: 72 (08 Jun 2018 08:53) (60 - 86)  BP: 175/69 (08 Jun 2018 08:53) (151/76 - 192/76)  BP(mean): --  RR: 18 (08 Jun 2018 08:53) (18 - 20)  SpO2: 95% (08 Jun 2018 08:53) (92% - 96%)  I&O's Summary    PHYSICAL EXAM:  Constitutional: NAD  Neurological: Alert and oriented  HEENT: EOMI, no JVD  Cardiovascular: S1 and S2, reg  Pulmonary: breath sounds bilaterally dec at bases  Gastrointestinal: Bowel Sounds present, soft, nontender  Ext: peripheral edema 2-3+  Skin: No rashes, No cyanosis.  Psych:  Mood & affect appropriate   LABS: All Labs Reviewed:                        11.5   4.29  )-----------( 214      ( 08 Jun 2018 06:24 )             36.7     06-08    144  |  114<H>  |  60<H>  ----------------------------<  66<L>  6.0<H>   |  23  |  3.10<H>    Ca    8.2<L>      08 Jun 2018 06:24    TPro  7.1  /  Alb  3.2<L>  /  TBili  0.5  /  DBili  x   /  AST  21  /  ALT  22  /  AlkPhos  94  06-07    PT/INR - ( 07 Jun 2018 19:59 )   PT: 14.5 sec;   INR: 1.32 ratio         PTT - ( 07 Jun 2018 19:59 )  PTT:35.8 sec  CARDIAC MARKERS ( 08 Jun 2018 06:24 )  x     / x     / 176 U/L / x     / x      CARDIAC MARKERS ( 07 Jun 2018 19:59 )  .025 ng/mL / x     / x     / x     / 4.8 ng/mL      Blood Culture:   06-07 @ 19:59  Pro Bnp [id number]    06-08 @ 10:09  TSH: 2.04    67 yo m ,  ,plus kids , lives with wife , works from home , has hx of hypertension , osteoarthritis , and hyperlipidemia , and ckd stage 3 , and DM T2, and hx of foot surgery , and chronic edema and lymphedema in legs , and presents with a few weeks of increasing edema and poor dm control and shortness of breath and now with inability to void , and increased weakness,   denies cp , fever, chills , nausea a, vomiting , head aches , (07 Jun 2018 21:33)    ECHO 8/5/16; TDS, probably normal EF, severe LVH, LAE  Lexiscan: 8/2016: Abnormal  CARDIAC CATH: 8/16/16: D2 50%, otherwise normal coronaries    RADIOLOGY/EKG: Diagnosis Line Sinus rhythm with 1st degree AV block  Poor R Wave Progression     2D ECHO: repeat pending  CXR: CHF    PLAN  1 Cont IV lasi  2 Check echo   3 Would repeat CXR over the weekend  4 K 6.0, elevated CR. For Kayexalate  5 Renal Consult  6 No Ace/ARb  7 Observe on remote tele, stephanie overnight  8 Will follow here, pt sees me outpt as well  9 If BP elevation persists would add amlodipine 5 mg (No BB for now sec bradycardia overnight)  10 Would add ASA and statin for NON-OB CAD

## 2018-06-08 NOTE — CONSULT NOTE ADULT - PROBLEM SELECTOR PROBLEM 4
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

## 2018-06-08 NOTE — PROGRESS NOTE ADULT - SUBJECTIVE AND OBJECTIVE BOX
PCP  Subjective:   in bed , awake , alert , responsive , ate BF    Objective:   Vital Signs Last 24 Hrs  T(C): 36.6 (18 @ 04:43), Max: 36.9 (18 @ 18:24)  T(F): 97.9 (18 @ 04:43), Max: 98.4 (18 @ 18:24)  HR: 62 (18 @ 04:43) (60 - 86)  BP: 151/76 (18 @ 04:43) (151/76 - 192/76)  BP(mean): --  RR: 18 (18 @ 04:43) (18 - 20)  SpO2: 96% (18 @ 04:43) (92% - 96%)  Daily Height in cm: 187.96 (2018 18:24)    Daily Weight in k (2018 04:43)    GENERAL:  well developed well nourished obese male , alert and responsive   EYES: eomi jose   NECK: supple ,   CHEST/LUNG: clear  HEART: s1 s2 regular  ABDOMEN:  soft , nt + bs , obese , large panus with edema in low abdoman  EXTREMITIES:  edema diffuse and swelling , and redness and hyperpigmentation ,   SKIN:  warm , hyperpigmentation and chronic venous staisis in legs bilateral , and diabetic feet issues ,   CNS:  alert and responsive, non focal ,      Allergies: Allergies    adhesives (Rash)  No Known Drug Allergies    Intolerances        Home Medications:  amLODIPine 5 mg oral tablet: 1 tab(s) orally once a day (2018 21:35)  aspirin 81 mg oral tablet: 1 tab(s) orally once a day (2018 18:32)  hydrALAZINE 25 mg oral tablet:  orally once a day (2018 18:32)  Lantus:  subcutaneous  (2018 18:32)  losartan 50 mg oral tablet: 1 tab(s) orally once a day (2018 18:32)  tamsulosin 0.4 mg oral capsule: 1 cap(s) orally once a day (2018 18:32)  verapamil 240 mg/12 hours oral tablet, extended release: 1 tab(s) orally once a day (in the morning) (2018 18:32)    Medications:   acetaminophen   Tablet. 650 milliGRAM(s) Oral every 6 hours PRN  ceFAZolin   IVPB 1000 milliGRAM(s) IV Intermittent every 12 hours  dextrose 40% Gel 15 Gram(s) Oral once PRN  dextrose 5%. 1000 milliLiter(s) IV Continuous <Continuous>  dextrose 50% Injectable 12.5 Gram(s) IV Push once  dextrose 50% Injectable 25 Gram(s) IV Push once  dextrose 50% Injectable 25 Gram(s) IV Push once  enoxaparin Injectable 40 milliGRAM(s) SubCutaneous daily  furosemide   Injectable 20 milliGRAM(s) IV Push daily  glucagon  Injectable 1 milliGRAM(s) IntraMuscular once PRN  guaiFENesin   Syrup  (Sugar-Free) 100 milliGRAM(s) Oral every 6 hours PRN  hydrALAZINE 25 milliGRAM(s) Oral three times a day  insulin glargine Injectable (LANTUS) 10 Unit(s) SubCutaneous every morning  insulin lispro (HumaLOG) corrective regimen sliding scale   SubCutaneous three times a day before meals      LABS:                        11.5   4.29  )-----------( 214      ( 2018 06:24 )             36.7     06-08    144  |  114<H>  |  60<H>  ----------------------------<  66<L>  6.0<H>   |  23  |  3.10<H>    Ca    8.2<L>      2018 06:24    TPro  7.1  /  Alb  3.2<L>  /  TBili  0.5  /  DBili  x   /  AST  21  /  ALT  22  /  AlkPhos  94  -    PT/INR - ( 2018 19:59 )   PT: 14.5 sec;   INR: 1.32 ratio         PTT - ( 2018 19:59 )  PTT:35.8 sec   @ 19:59  INR 1.32    Urinalysis Basic - ( 2018 20:53 )    Color: Yellow / Appearance: Clear / S.020 / pH: x  Gluc: x / Ketone: Negative  / Bili: Negative / Urobili: Negative   Blood: x / Protein: 25 mg/dL / Nitrite: Negative   Leuk Esterase: Moderate / RBC: 3-5 /HPF / WBC 11-25   Sq Epi: x / Non Sq Epi: Few / Bacteria: Few        CAPILLARY BLOOD GLUCOSE      POCT Blood Glucose.: 86 mg/dL (2018 08:13)  POCT Blood Glucose.: 67 mg/dL (2018 07:37)      CARDIAC MARKERS ( 2018 06:24 )  x     / x     / 176 U/L / x     / x      CARDIAC MARKERS ( 2018 19:59 )  .025 ng/mL / x     / x     / x     / 4.8 ng/mL      RECENT CULTURES:

## 2018-06-08 NOTE — CONSULT NOTE ADULT - SUBJECTIVE AND OBJECTIVE BOX
Patient is a 68y Male whom presented to the hospital with ckd and venkata     PAST MEDICAL & SURGICAL HISTORY:  BPH (benign prostatic hyperplasia)  Hyperlipemia  Hypertension  No significant past surgical history      MEDICATIONS  (STANDING):  amLODIPine   Tablet 5 milliGRAM(s) Oral daily  aspirin enteric coated 81 milliGRAM(s) Oral daily  atorvastatin 10 milliGRAM(s) Oral at bedtime  ceFAZolin   IVPB 1000 milliGRAM(s) IV Intermittent every 12 hours  dextrose 5%. 1000 milliLiter(s) (50 mL/Hr) IV Continuous <Continuous>  dextrose 50% Injectable 12.5 Gram(s) IV Push once  dextrose 50% Injectable 25 Gram(s) IV Push once  dextrose 50% Injectable 25 Gram(s) IV Push once  enoxaparin Injectable 40 milliGRAM(s) SubCutaneous daily  furosemide   Injectable 20 milliGRAM(s) IV Push daily  hydrALAZINE 50 milliGRAM(s) Oral every 6 hours  insulin lispro (HumaLOG) corrective regimen sliding scale   SubCutaneous three times a day before meals      Allergies    adhesives (Rash)  No Known Drug Allergies    Intolerances        SOCIAL HISTORY:  Denies ETOh,Smoking,     FAMILY HISTORY:      REVIEW OF SYSTEMS:    CONSTITUTIONAL: No weakness, fevers or chills  EYES/ENT: No visual changes;  no throat pain   NECK: No pain or stiffness  RESPIRATORY: No cough, wheezing, hemoptysis; No shortness of breath  CARDIOVASCULAR: No chest pain or palpitations  GASTROINTESTINAL: No abdominal or epigastric pain. No nausea, vomiting,     No diarrhea or constipation. No melena   GENITOURINARY: No dysuria, frequency or hematuria  NEUROLOGICAL: No numbness or weakness  SKIN: dry      VITAL:  T(C): , Max: 36.9 (18 @ 18:24)  T(F): , Max: 98.4 (18 @ 18:24)  HR: 60 (18 @ 16:12)  BP: 180/78 (18 @ 16:23)  BP(mean): --  RR: 18 (18 @ 16:12)  SpO2: 94% (18 @ 16:12)  Wt(kg): --    I and O's:     @ 07:01  -   @ 16:43  --------------------------------------------------------  IN: 300 mL / OUT: 0 mL / NET: 300 mL      Height (cm): 187.96 ( @ 18:24)  Weight (kg): 154.2 ( @ 18:24)  BMI (kg/m2): 43.6 ( @ 18:24)  BSA (m2): 2.72 ( @ 18:24)    PHYSICAL EXAM:    Constitutional: NAD  HEENT: conjunctive   clear   Neck:  No JVD  Respiratory: CTAB  Cardiovascular: S1 and S2  Gastrointestinal: BS+, soft, NT/ND  Extremities: pos 3 plus  peripheral edema  Neurological: A/O x 3, no focal deficits  Psychiatric: Normal mood, normal affect  : No Sidhu  Skin: No rashes  Access: Not applicable    LABS:                        11.5   4.29  )-----------( 214      ( 2018 06:24 )             36.7     06-08    144  |  114<H>  |  60<H>  ----------------------------<  66<L>  6.0<H>   |  23  |  3.10<H>    Ca    8.2<L>      2018 06:24    TPro  7.1  /  Alb  3.2<L>  /  TBili  0.5  /  DBili  x   /  AST  21  /  ALT  22  /  AlkPhos  94  06-07      Urine Studies:  Urinalysis Basic - ( 2018 20:53 )    Color: Yellow / Appearance: Clear / S.020 / pH: x  Gluc: x / Ketone: Negative  / Bili: Negative / Urobili: Negative   Blood: x / Protein: 25 mg/dL / Nitrite: Negative   Leuk Esterase: Moderate / RBC: 3-5 /HPF / WBC 11-25   Sq Epi: x / Non Sq Epi: Few / Bacteria: Few            RADIOLOGY & ADDITIONAL STUDIES:

## 2018-06-08 NOTE — CONSULT NOTE ADULT - PROBLEM SELECTOR RECOMMENDATION 9
ACUTE RENAL FAILURE: posiible atn vs ain   venkata wallis in progress   will get us kidney  due to poor dm controlled and diuresis    Serum creatinine is stable at 3.1   , approximating GFR is decreased   ml/min.   There is  progression. No uremic symptoms   No evidence of anemia.  Fluid status stable.  Will continue to avoid nephrotoxic drugs.  Patient remains asymptomatic.   Continue current therapy.  hold   diuretic.  hold  inhibitor.  hold   ARB.

## 2018-06-09 LAB
ALBUMIN SERPL ELPH-MCNC: 2.6 G/DL — LOW (ref 3.3–5)
ALP SERPL-CCNC: 86 U/L — SIGNIFICANT CHANGE UP (ref 40–120)
ALT FLD-CCNC: 16 U/L — SIGNIFICANT CHANGE UP (ref 12–78)
ANION GAP SERPL CALC-SCNC: 7 MMOL/L — SIGNIFICANT CHANGE UP (ref 5–17)
APPEARANCE UR: CLEAR — SIGNIFICANT CHANGE UP
AST SERPL-CCNC: 18 U/L — SIGNIFICANT CHANGE UP (ref 15–37)
BACTERIA # UR AUTO: ABNORMAL
BASOPHILS # BLD AUTO: 0.04 K/UL — SIGNIFICANT CHANGE UP (ref 0–0.2)
BASOPHILS NFR BLD AUTO: 0.7 % — SIGNIFICANT CHANGE UP (ref 0–2)
BILIRUB DIRECT SERPL-MCNC: 0.1 MG/DL — SIGNIFICANT CHANGE UP (ref 0.05–0.2)
BILIRUB INDIRECT FLD-MCNC: 0.3 MG/DL — SIGNIFICANT CHANGE UP (ref 0.2–1)
BILIRUB SERPL-MCNC: 0.4 MG/DL — SIGNIFICANT CHANGE UP (ref 0.2–1.2)
BILIRUB UR-MCNC: NEGATIVE — SIGNIFICANT CHANGE UP
BUN SERPL-MCNC: 54 MG/DL — HIGH (ref 7–23)
C3 SERPL-MCNC: 113 MG/DL — SIGNIFICANT CHANGE UP (ref 81–157)
C4 SERPL-MCNC: 30 MG/DL — SIGNIFICANT CHANGE UP (ref 13–39)
CALCIUM SERPL-MCNC: 8 MG/DL — LOW (ref 8.5–10.1)
CALCIUM SERPL-MCNC: 8.8 MG/DL — SIGNIFICANT CHANGE UP (ref 8.4–10.5)
CHLORIDE SERPL-SCNC: 114 MMOL/L — HIGH (ref 96–108)
CO2 SERPL-SCNC: 24 MMOL/L — SIGNIFICANT CHANGE UP (ref 22–31)
COLOR SPEC: YELLOW — SIGNIFICANT CHANGE UP
COMMENT - URINE: SIGNIFICANT CHANGE UP
COMMENT - URINE: SIGNIFICANT CHANGE UP
CREAT ?TM UR-MCNC: 97 MG/DL — SIGNIFICANT CHANGE UP
CREAT ?TM UR-MCNC: 97 MG/DL — SIGNIFICANT CHANGE UP
CREAT SERPL-MCNC: 2.9 MG/DL — HIGH (ref 0.5–1.3)
DIFF PNL FLD: ABNORMAL
EOSINOPHIL # BLD AUTO: 0.22 K/UL — SIGNIFICANT CHANGE UP (ref 0–0.5)
EOSINOPHIL NFR BLD AUTO: 3.9 % — SIGNIFICANT CHANGE UP (ref 0–6)
EOSINOPHIL NFR URNS MANUAL: NEGATIVE — SIGNIFICANT CHANGE UP
EPI CELLS # UR: ABNORMAL
GLUCOSE SERPL-MCNC: 91 MG/DL — SIGNIFICANT CHANGE UP (ref 70–99)
GLUCOSE UR QL: NEGATIVE — SIGNIFICANT CHANGE UP
GRAN CASTS # UR COMP ASSIST: ABNORMAL /LPF
HAV IGM SER-ACNC: SIGNIFICANT CHANGE UP
HBV CORE IGM SER-ACNC: SIGNIFICANT CHANGE UP
HBV SURFACE AG SER-ACNC: SIGNIFICANT CHANGE UP
HCT VFR BLD CALC: 38.3 % — LOW (ref 39–50)
HCV AB S/CO SERPL IA: 0.12 S/CO — SIGNIFICANT CHANGE UP
HCV AB SERPL-IMP: SIGNIFICANT CHANGE UP
HGB BLD-MCNC: 11.8 G/DL — LOW (ref 13–17)
HIV 1+2 AB+HIV1 P24 AG SERPL QL IA: SIGNIFICANT CHANGE UP
HYALINE CASTS # UR AUTO: ABNORMAL /LPF
IGA FLD-MCNC: 409 MG/DL — SIGNIFICANT CHANGE UP (ref 84–499)
IGG FLD-MCNC: 1280 MG/DL — SIGNIFICANT CHANGE UP (ref 610–1660)
IGM SERPL-MCNC: 171 MG/DL — SIGNIFICANT CHANGE UP (ref 35–242)
IMM GRANULOCYTES NFR BLD AUTO: 0.2 % — SIGNIFICANT CHANGE UP (ref 0–1.5)
INTERPRETATION SERPL IFE-IMP: SIGNIFICANT CHANGE UP
KAPPA LC SER QL IFE: 8.69 MG/DL — HIGH (ref 0.33–1.94)
KAPPA/LAMBDA FREE LIGHT CHAIN RATIO, SERUM: 1.12 RATIO — SIGNIFICANT CHANGE UP (ref 0.26–1.65)
KETONES UR-MCNC: ABNORMAL
LAMBDA LC SER QL IFE: 7.77 MG/DL — HIGH (ref 0.57–2.63)
LEUKOCYTE ESTERASE UR-ACNC: NEGATIVE — SIGNIFICANT CHANGE UP
LYMPHOCYTES # BLD AUTO: 0.46 K/UL — LOW (ref 1–3.3)
LYMPHOCYTES # BLD AUTO: 8.1 % — LOW (ref 13–44)
MCHC RBC-ENTMCNC: 28.9 PG — SIGNIFICANT CHANGE UP (ref 27–34)
MCHC RBC-ENTMCNC: 30.8 GM/DL — LOW (ref 32–36)
MCV RBC AUTO: 93.9 FL — SIGNIFICANT CHANGE UP (ref 80–100)
MICROALBUMIN UR-MCNC: 227.3 MG/DL — SIGNIFICANT CHANGE UP
MICROALBUMIN/CREAT UR-RTO: 2343 MG/G — HIGH (ref 0–30)
MONOCYTES # BLD AUTO: 0.47 K/UL — SIGNIFICANT CHANGE UP (ref 0–0.9)
MONOCYTES NFR BLD AUTO: 8.3 % — SIGNIFICANT CHANGE UP (ref 2–14)
NEUTROPHILS # BLD AUTO: 4.49 K/UL — SIGNIFICANT CHANGE UP (ref 1.8–7.4)
NEUTROPHILS NFR BLD AUTO: 78.8 % — HIGH (ref 43–77)
NITRITE UR-MCNC: NEGATIVE — SIGNIFICANT CHANGE UP
NRBC # BLD: 0 /100 WBCS — SIGNIFICANT CHANGE UP (ref 0–0)
NT-PROBNP SERPL-SCNC: HIGH PG/ML (ref 0–125)
PH UR: 5 — SIGNIFICANT CHANGE UP (ref 5–8)
PHOSPHATE SERPL-MCNC: 4.3 MG/DL — SIGNIFICANT CHANGE UP (ref 2.5–4.5)
PLATELET # BLD AUTO: 213 K/UL — SIGNIFICANT CHANGE UP (ref 150–400)
POTASSIUM SERPL-MCNC: 5.2 MMOL/L — SIGNIFICANT CHANGE UP (ref 3.5–5.3)
POTASSIUM SERPL-SCNC: 5.2 MMOL/L — SIGNIFICANT CHANGE UP (ref 3.5–5.3)
PROT ?TM UR-MCNC: 420 MG/DL — HIGH (ref 0–12)
PROT ?TM UR-MCNC: 421 MG/DL — HIGH (ref 0–12)
PROT SERPL-MCNC: 6 G/DL — SIGNIFICANT CHANGE UP (ref 6–8.3)
PROT UR-MCNC: 500 MG/DL
PROT/CREAT UR-RTO: 4.3 RATIO — HIGH (ref 0–0.2)
PTH-INTACT FLD-MCNC: 178 PG/ML — HIGH (ref 15–65)
RBC # BLD: 4.08 M/UL — LOW (ref 4.2–5.8)
RBC # FLD: 15.5 % — HIGH (ref 10.3–14.5)
RBC CASTS # UR COMP ASSIST: ABNORMAL /HPF (ref 0–4)
SODIUM SERPL-SCNC: 145 MMOL/L — SIGNIFICANT CHANGE UP (ref 135–145)
SODIUM UR-SCNC: 51 MMOL/L — SIGNIFICANT CHANGE UP
SP GR SPEC: 1.02 — SIGNIFICANT CHANGE UP (ref 1.01–1.02)
URATE UR-MCNC: 20.7 MG/DL — SIGNIFICANT CHANGE UP
UROBILINOGEN FLD QL: NEGATIVE — SIGNIFICANT CHANGE UP
WBC # BLD: 5.69 K/UL — SIGNIFICANT CHANGE UP (ref 3.8–10.5)
WBC # FLD AUTO: 5.69 K/UL — SIGNIFICANT CHANGE UP (ref 3.8–10.5)
WBC UR QL: SIGNIFICANT CHANGE UP

## 2018-06-09 PROCEDURE — 71046 X-RAY EXAM CHEST 2 VIEWS: CPT | Mod: 26

## 2018-06-09 RX ORDER — NYSTATIN CREAM 100000 [USP'U]/G
1 CREAM TOPICAL
Qty: 0 | Refills: 0 | Status: DISCONTINUED | OUTPATIENT
Start: 2018-06-09 | End: 2018-06-15

## 2018-06-09 RX ORDER — FUROSEMIDE 40 MG
40 TABLET ORAL
Qty: 0 | Refills: 0 | Status: DISCONTINUED | OUTPATIENT
Start: 2018-06-09 | End: 2018-06-14

## 2018-06-09 RX ORDER — AMLODIPINE BESYLATE 2.5 MG/1
5 TABLET ORAL DAILY
Qty: 0 | Refills: 0 | Status: DISCONTINUED | OUTPATIENT
Start: 2018-06-09 | End: 2018-06-13

## 2018-06-09 RX ADMIN — Medication 50 MILLIGRAM(S): at 17:20

## 2018-06-09 RX ADMIN — ENOXAPARIN SODIUM 40 MILLIGRAM(S): 100 INJECTION SUBCUTANEOUS at 11:26

## 2018-06-09 RX ADMIN — Medication 100 MILLIGRAM(S): at 06:25

## 2018-06-09 RX ADMIN — ISOSORBIDE DINITRATE 20 MILLIGRAM(S): 5 TABLET ORAL at 13:08

## 2018-06-09 RX ADMIN — ISOSORBIDE DINITRATE 20 MILLIGRAM(S): 5 TABLET ORAL at 21:12

## 2018-06-09 RX ADMIN — ISOSORBIDE DINITRATE 20 MILLIGRAM(S): 5 TABLET ORAL at 06:25

## 2018-06-09 RX ADMIN — Medication 40 MILLIGRAM(S): at 17:20

## 2018-06-09 RX ADMIN — AMLODIPINE BESYLATE 5 MILLIGRAM(S): 2.5 TABLET ORAL at 21:12

## 2018-06-09 RX ADMIN — Medication 81 MILLIGRAM(S): at 11:26

## 2018-06-09 RX ADMIN — Medication 20 MILLIGRAM(S): at 06:25

## 2018-06-09 RX ADMIN — Medication 50 MILLIGRAM(S): at 06:25

## 2018-06-09 RX ADMIN — Medication 50 MILLIGRAM(S): at 21:13

## 2018-06-09 RX ADMIN — ATORVASTATIN CALCIUM 20 MILLIGRAM(S): 80 TABLET, FILM COATED ORAL at 21:12

## 2018-06-09 RX ADMIN — Medication 50 MILLIGRAM(S): at 11:57

## 2018-06-09 RX ADMIN — Medication 100 MILLIGRAM(S): at 17:22

## 2018-06-09 RX ADMIN — NYSTATIN CREAM 1 APPLICATION(S): 100000 CREAM TOPICAL at 17:28

## 2018-06-09 RX ADMIN — AMLODIPINE BESYLATE 10 MILLIGRAM(S): 2.5 TABLET ORAL at 06:25

## 2018-06-09 NOTE — PROGRESS NOTE ADULT - SUBJECTIVE AND OBJECTIVE BOX
Patient is a 68y Male whom presented to the hospital with ckd and venkata     PAST MEDICAL & SURGICAL HISTORY:  BPH (benign prostatic hyperplasia)  Hyperlipemia  Hypertension  No significant past surgical history      MEDICATIONS  (STANDING):  amLODIPine   Tablet 5 milliGRAM(s) Oral daily  aspirin enteric coated 81 milliGRAM(s) Oral daily  atorvastatin 10 milliGRAM(s) Oral at bedtime  ceFAZolin   IVPB 1000 milliGRAM(s) IV Intermittent every 12 hours  dextrose 5%. 1000 milliLiter(s) (50 mL/Hr) IV Continuous <Continuous>  dextrose 50% Injectable 12.5 Gram(s) IV Push once  dextrose 50% Injectable 25 Gram(s) IV Push once  dextrose 50% Injectable 25 Gram(s) IV Push once  enoxaparin Injectable 40 milliGRAM(s) SubCutaneous daily  furosemide   Injectable 20 milliGRAM(s) IV Push daily  hydrALAZINE 50 milliGRAM(s) Oral every 6 hours  insulin lispro (HumaLOG) corrective regimen sliding scale   SubCutaneous three times a day before meals          REVIEW OF SYSTEMS:    CONSTITUTIONAL: No weakness, fevers or chills  EYES/ENT: No visual changes;  no throat pain   NECK: No pain or stiffness  RESPIRATORY: No cough, wheezing, hemoptysis; No shortness of breath  CARDIOVASCULAR: No chest pain or palpitations  GASTROINTESTINAL: No abdominal or epigastric pain. No nausea, vomiting,     No diarrhea or constipation. No melena   GENITOURINARY: No dysuria, frequency or hematuria  NEUROLOGICAL: No numbness or weakness  SKIN: dry                            11.8   5.69  )-----------( 213      ( 2018 07:23 )             38.3       CBC Full  -  ( 2018 07:23 )  WBC Count : 5.69 K/uL  Hemoglobin : 11.8 g/dL  Hematocrit : 38.3 %  Platelet Count - Automated : 213 K/uL  Mean Cell Volume : 93.9 fl  Mean Cell Hemoglobin : 28.9 pg  Mean Cell Hemoglobin Concentration : 30.8 gm/dL  Auto Neutrophil # : 4.49 K/uL  Auto Lymphocyte # : 0.46 K/uL  Auto Monocyte # : 0.47 K/uL  Auto Eosinophil # : 0.22 K/uL  Auto Basophil # : 0.04 K/uL  Auto Neutrophil % : 78.8 %  Auto Lymphocyte % : 8.1 %  Auto Monocyte % : 8.3 %  Auto Eosinophil % : 3.9 %  Auto Basophil % : 0.7 %          145  |  114<H>  |  54<H>  ----------------------------<  91  5.2   |  24  |  2.90<H>    Ca    8.0<L>      2018 07:23  Phos  4.3         TPro  6.0  /  Alb  2.6<L>  /  TBili  0.4  /  DBili  .10  /  AST  18  /  ALT  16  /  AlkPhos  86        CAPILLARY BLOOD GLUCOSE      POCT Blood Glucose.: 102 mg/dL (2018 11:48)  POCT Blood Glucose.: 90 mg/dL (2018 07:32)  POCT Blood Glucose.: 108 mg/dL (2018 21:28)  POCT Blood Glucose.: 99 mg/dL (2018 16:48)      Vital Signs Last 24 Hrs  T(C): 36.3 (2018 11:33), Max: 37.4 (2018 00:00)  T(F): 97.4 (2018 11:33), Max: 99.3 (2018 00:00)  HR: 67 (2018 11:33) (60 - 88)  BP: 154/82 (2018 11:33) (150/82 - 216/71)  BP(mean): --  RR: 18 (2018 11:33) (17 - 20)  SpO2: 96% (2018 11:33) (94% - 97%)    Urinalysis Basic - ( 2018 07:39 )    Color: Yellow / Appearance: Clear / S.020 / pH: x  Gluc: x / Ketone: Trace  / Bili: Negative / Urobili: Negative   Blood: x / Protein: 500 mg/dL / Nitrite: Negative   Leuk Esterase: Negative / RBC: 3-5 /HPF / WBC 3-5   Sq Epi: x / Non Sq Epi: Moderate / Bacteria: Few        PT/INR - ( 2018 19:59 )   PT: 14.5 sec;   INR: 1.32 ratio         PTT - ( 2018 19:59 )  PTT:35.8 sec  PHYSICAL EXAM:    Constitutional: NAD  HEENT: conjunctive   clear   Neck:  No JVD  Respiratory: CTAB  Cardiovascular: S1 and S2  Gastrointestinal: BS+, soft, NT/ND  Extremities: pos 3 plus  peripheral edema  Neurological: A/O x 3, no focal deficits  Psychiatric: Normal mood, normal affect  : No Sidhu  Skin: No rashes  Access: Not applicable

## 2018-06-09 NOTE — PROGRESS NOTE ADULT - SUBJECTIVE AND OBJECTIVE BOX
[facility] Cardiology Progress Note [phone] (Dr. Washburn, Jayson, Teresita, Kimmie)    CHIEF COMPLAINT: Patient is a 68y old  Male who presents with a chief complaint of Increased Swelling in bilateral lower extremities (08 Jun 2018 00:17)      Follow Up Today: The patient denies any chest discomfort or shortness of breath.    HPI:  69 yo m ,  ,plus kids , lives with wife , works from home , has hx of hypertension , osteoarthritis , and hyperlipidemia , and ckd stage 3 , and DM T2, and hx of foot surgery , and chronic edema and lymphedema in legs , and presents with a few weeks of increasing edema and poor dm control and shortness of breath and now with inability to void , and increased weakness,   denies cp , fever, chills , nausea a, vomiting , head aches , (07 Jun 2018 21:33)      PAST MEDICAL & SURGICAL HISTORY:  BPH (benign prostatic hyperplasia)  Hyperlipemia  Hypertension  No significant past surgical history      MEDICATIONS  (STANDING):  amLODIPine   Tablet 10 milliGRAM(s) Oral daily  aspirin enteric coated 81 milliGRAM(s) Oral daily  atorvastatin 20 milliGRAM(s) Oral at bedtime  ceFAZolin   IVPB 1000 milliGRAM(s) IV Intermittent every 12 hours  dextrose 5%. 1000 milliLiter(s) (50 mL/Hr) IV Continuous <Continuous>  dextrose 50% Injectable 12.5 Gram(s) IV Push once  dextrose 50% Injectable 25 Gram(s) IV Push once  dextrose 50% Injectable 25 Gram(s) IV Push once  enoxaparin Injectable 40 milliGRAM(s) SubCutaneous daily  furosemide   Injectable 20 milliGRAM(s) IV Push daily  hydrALAZINE 50 milliGRAM(s) Oral every 6 hours  insulin lispro (HumaLOG) corrective regimen sliding scale   SubCutaneous three times a day before meals  isosorbide   dinitrate Tablet (ISORDIL) 20 milliGRAM(s) Oral three times a day    MEDICATIONS  (PRN):  acetaminophen   Tablet. 650 milliGRAM(s) Oral every 6 hours PRN Mild Pain (1 - 3)  dextrose 40% Gel 15 Gram(s) Oral once PRN Blood Glucose LESS THAN 70 milliGRAM(s)/deciliter  glucagon  Injectable 1 milliGRAM(s) IntraMuscular once PRN Glucose LESS THAN 70 milligrams/deciliter  guaiFENesin   Syrup  (Sugar-Free) 100 milliGRAM(s) Oral every 6 hours PRN Cough      Allergies    adhesives (Rash)  No Known Drug Allergies    Intolerances        REVIEW OF SYSTEMS:    All other review of systems is negative unless indicated above    Vital Signs Last 24 Hrs  T(C): 36.5 (09 Jun 2018 04:45), Max: 37.4 (09 Jun 2018 00:00)  T(F): 97.7 (09 Jun 2018 04:45), Max: 99.3 (09 Jun 2018 00:00)  HR: 67 (09 Jun 2018 04:45) (60 - 88)  BP: 190/69 (09 Jun 2018 04:45) (150/82 - 216/71)  BP(mean): --  RR: 18 (09 Jun 2018 04:45) (17 - 20)  SpO2: 95% (09 Jun 2018 04:45) (94% - 97%)    I&O's Summary    08 Jun 2018 07:01  -  09 Jun 2018 07:00  --------------------------------------------------------  IN: 300 mL / OUT: 0 mL / NET: 300 mL        PHYSICAL EXAM:    Constitutional: NAD, awake and alert, well-developed  Eyes:  EOMI,  Pupils round, No oral cyanosis.  HEENT: No exudate or erythema  Pulmonary: Non-labored, breath sounds are clear bilaterally, No wheezing, rales or rhonchi  Cardiovascular: Regular, S1 and S2, No murmurs, rubs, gallops oir clicks  Gastrointestinal: Bowel Sounds present, soft, nontender.   Ext: No significant LE edema with good pulses x 4  Neurological: Alert, no gross focal motor deficits  Skin: No rashes.  Psych:  Mood & affect appropriate    LABS: All Labs Reviewed:                        11.5   4.29  )-----------( 214      ( 08 Jun 2018 06:24 )             36.7                         13.1   5.30  )-----------( 212      ( 07 Jun 2018 19:59 )             41.3     08 Jun 2018 06:24    144    |  114    |  60     ----------------------------<  66     6.0     |  23     |  3.10   07 Jun 2018 19:59    143    |  115    |  60     ----------------------------<  83     6.1     |  18     |  3.10     Ca    8.2        08 Jun 2018 06:24  Ca    8.6        07 Jun 2018 19:59    TPro  7.1    /  Alb  3.2    /  TBili  0.5    /  DBili  x      /  AST  21     /  ALT  22     /  AlkPhos  94     07 Jun 2018 19:59    PT/INR - ( 07 Jun 2018 19:59 )   PT: 14.5 sec;   INR: 1.32 ratio         PTT - ( 07 Jun 2018 19:59 )  PTT:35.8 sec  CARDIAC MARKERS ( 08 Jun 2018 06:24 )  x     / x     / 176 U/L / x     / x      CARDIAC MARKERS ( 07 Jun 2018 19:59 )  .025 ng/mL / x     / x     / x     / 4.8 ng/mL      Blood Culture:   06-07 @ 19:59  Pro Bnp [id number]    06-08 @ 10:09  TSH: 2.04      RADIOLOGY/EKG:  < from: TTE Echo Doppler w/o Cont (06.08.18 @ 13:56) >  Conclusion:   1. This is a very technically limited study.  2. There is normal left ventricular size and probable left ventricular   systolic function with an ejection fraction greater than 55%.  3. There is diastolic dysfunction.  4. There is mild left ventricular hypertrophy.  5. Given limited endocardial definition, wall motion abnormalities could   not be excluded on this study.  6. The aortic valve appears mildly calcified with mild aortic valve   stenosis with a mean aortic valve gradient of 16 mmHg and an aortic valve   area of 1.3.  7. There is mild tricuspid regurgitation. The right ventricular systolic   pressure could not be calculated given a low velocity.  8. There is moderate left atrial enlargement.  9. There is a small left-sided pleural effusion.    < end of copied text >    Attending Attestation:   20 minutes spent on total encounter; more than 50% of the visit was spent counseling and/or coordinating care by the attending physician.     ASSESSMENT AND PLAN [facility] Cardiology Progress Note [phone] (Dr. Washburn, Jayson, Teresita, Kimmie)    CHIEF COMPLAINT: Patient is a 68y old  Male who presents with a chief complaint of Increased Swelling in bilateral lower extremities (08 Jun 2018 00:17)      Follow Up Today: The patient denies any chest discomfort or shortness of breath. He has had chronic leg swelling.      HPI:  67 yo m ,  ,plus kids , lives with wife , works from home , has hx of hypertension , osteoarthritis , and hyperlipidemia , and ckd stage 3 , and DM T2, and hx of left lower leg/foot surgery, and chronic edema and lymphedema in legs , and presents with a few weeks of increasing edema and poor dm control and shortness of breath and now with inability to void , and increased weakness,   denies cp , fever, chills , nausea a, vomiting , head aches , (07 Jun 2018 21:33)      PAST MEDICAL & SURGICAL HISTORY:  BPH (benign prostatic hyperplasia)  Hyperlipemia  Hypertension  No significant past surgical history      MEDICATIONS  (STANDING):  amLODIPine   Tablet 10 milliGRAM(s) Oral daily  aspirin enteric coated 81 milliGRAM(s) Oral daily  atorvastatin 20 milliGRAM(s) Oral at bedtime  ceFAZolin   IVPB 1000 milliGRAM(s) IV Intermittent every 12 hours  dextrose 5%. 1000 milliLiter(s) (50 mL/Hr) IV Continuous <Continuous>  dextrose 50% Injectable 12.5 Gram(s) IV Push once  dextrose 50% Injectable 25 Gram(s) IV Push once  dextrose 50% Injectable 25 Gram(s) IV Push once  enoxaparin Injectable 40 milliGRAM(s) SubCutaneous daily  furosemide   Injectable 20 milliGRAM(s) IV Push daily  hydrALAZINE 50 milliGRAM(s) Oral every 6 hours  insulin lispro (HumaLOG) corrective regimen sliding scale   SubCutaneous three times a day before meals  isosorbide   dinitrate Tablet (ISORDIL) 20 milliGRAM(s) Oral three times a day    MEDICATIONS  (PRN):  acetaminophen   Tablet. 650 milliGRAM(s) Oral every 6 hours PRN Mild Pain (1 - 3)  dextrose 40% Gel 15 Gram(s) Oral once PRN Blood Glucose LESS THAN 70 milliGRAM(s)/deciliter  glucagon  Injectable 1 milliGRAM(s) IntraMuscular once PRN Glucose LESS THAN 70 milligrams/deciliter  guaiFENesin   Syrup  (Sugar-Free) 100 milliGRAM(s) Oral every 6 hours PRN Cough      Allergies    adhesives (Rash)  No Known Drug Allergies    Intolerances        REVIEW OF SYSTEMS:    All other review of systems is negative unless indicated above    Vital Signs Last 24 Hrs  T(C): 36.5 (09 Jun 2018 04:45), Max: 37.4 (09 Jun 2018 00:00)  T(F): 97.7 (09 Jun 2018 04:45), Max: 99.3 (09 Jun 2018 00:00)  HR: 67 (09 Jun 2018 04:45) (60 - 88)  BP: 190/69 (09 Jun 2018 04:45) (150/82 - 216/71)  BP(mean): --  RR: 18 (09 Jun 2018 04:45) (17 - 20)  SpO2: 95% (09 Jun 2018 04:45) (94% - 97%)    I&O's Summary    08 Jun 2018 07:01  -  09 Jun 2018 07:00  --------------------------------------------------------  IN: 300 mL / OUT: 0 mL / NET: 300 mL        PHYSICAL EXAM:    Constitutional: NAD, awake and alert, well-developed  Eyes:  EOMI,  Pupils round, No oral cyanosis.  HEENT: No exudate or erythema  Pulmonary: Non-labored, breath sounds are clear bilaterally, No wheezing, rales or rhonchi  Cardiovascular: Regular, S1 and S2, mild systolic murmur  Gastrointestinal: Bowel Sounds present, soft, nontender morbidly obese.   Ext: 3 - 4+ LE edema  Neurological: Alert, no gross focal motor deficits  Skin: No rashes.  Psych:  Mood & affect appropriate    LABS: All Labs Reviewed:                        11.5   4.29  )-----------( 214      ( 08 Jun 2018 06:24 )             36.7                         13.1   5.30  )-----------( 212      ( 07 Jun 2018 19:59 )             41.3     08 Jun 2018 06:24    144    |  114    |  60     ----------------------------<  66     6.0     |  23     |  3.10   07 Jun 2018 19:59    143    |  115    |  60     ----------------------------<  83     6.1     |  18     |  3.10     Ca    8.2        08 Jun 2018 06:24  Ca    8.6        07 Jun 2018 19:59    TPro  7.1    /  Alb  3.2    /  TBili  0.5    /  DBili  x      /  AST  21     /  ALT  22     /  AlkPhos  94     07 Jun 2018 19:59    PT/INR - ( 07 Jun 2018 19:59 )   PT: 14.5 sec;   INR: 1.32 ratio         PTT - ( 07 Jun 2018 19:59 )  PTT:35.8 sec  CARDIAC MARKERS ( 08 Jun 2018 06:24 )  x     / x     / 176 U/L / x     / x      CARDIAC MARKERS ( 07 Jun 2018 19:59 )  .025 ng/mL / x     / x     / x     / 4.8 ng/mL      Blood Culture:   06-07 @ 19:59  Pro Bnp [id number]    06-08 @ 10:09  TSH: 2.04      RADIOLOGY/EKG:    < from: TTE Echo Doppler w/o Cont (06.08.18 @ 13:56) >  Conclusion:   1. This is a very technically limited study.  2. There is normal left ventricular size and probable left ventricular   systolic function with an ejection fraction greater than 55%.  3. There is diastolic dysfunction.  4. There is mild left ventricular hypertrophy.  5. Given limited endocardial definition, wall motion abnormalities could   not be excluded on this study.  6. The aortic valve appears mildly calcified with mild aortic valve   stenosis with a mean aortic valve gradient of 16 mmHg and an aortic valve   area of 1.3.  7. There is mild tricuspid regurgitation. The right ventricular systolic   pressure could not be calculated given a low velocity.  8. There is moderate left atrial enlargement.  9. There is a small left-sided pleural effusion.    < end of copied text >    Attending Attestation:   20 minutes spent on total encounter; more than 50% of the visit was spent counseling and/or coordinating care by the attending physician.     ASSESSMENT AND PLAN [facility] Cardiology Progress Note [phone] (Dr. Washburn, Jayson, Teresita, Kimmie)    CHIEF COMPLAINT: Patient is a 68y old  Male who presents with a chief complaint of Increased Swelling in bilateral lower extremities (08 Jun 2018 00:17)      Follow Up Today: The patient denies any chest discomfort or shortness of breath. He has had chronic leg swelling.  Overnight, multiple blood pressure problems with peak  mmHg. No headaches or CP with it.    HPI:  69 yo m ,  ,plus kids , lives with wife , works from home , has hx of hypertension , osteoarthritis , and hyperlipidemia , and ckd stage 3 , and DM T2, and hx of left lower leg/foot surgery, and chronic edema and lymphedema in legs , and presents with a few weeks of increasing edema and poor dm control and shortness of breath and now with inability to void , and increased weakness,   denies cp , fever, chills , nausea a, vomiting , head aches , (07 Jun 2018 21:33)      PAST MEDICAL & SURGICAL HISTORY:  BPH (benign prostatic hyperplasia)  Hyperlipemia  Hypertension  No significant past surgical history      MEDICATIONS  (STANDING):  amLODIPine   Tablet 10 milliGRAM(s) Oral daily  aspirin enteric coated 81 milliGRAM(s) Oral daily  atorvastatin 20 milliGRAM(s) Oral at bedtime  ceFAZolin   IVPB 1000 milliGRAM(s) IV Intermittent every 12 hours  dextrose 5%. 1000 milliLiter(s) (50 mL/Hr) IV Continuous <Continuous>  dextrose 50% Injectable 12.5 Gram(s) IV Push once  dextrose 50% Injectable 25 Gram(s) IV Push once  dextrose 50% Injectable 25 Gram(s) IV Push once  enoxaparin Injectable 40 milliGRAM(s) SubCutaneous daily  furosemide   Injectable 20 milliGRAM(s) IV Push daily  hydrALAZINE 50 milliGRAM(s) Oral every 6 hours  insulin lispro (HumaLOG) corrective regimen sliding scale   SubCutaneous three times a day before meals  isosorbide   dinitrate Tablet (ISORDIL) 20 milliGRAM(s) Oral three times a day    MEDICATIONS  (PRN):  acetaminophen   Tablet. 650 milliGRAM(s) Oral every 6 hours PRN Mild Pain (1 - 3)  dextrose 40% Gel 15 Gram(s) Oral once PRN Blood Glucose LESS THAN 70 milliGRAM(s)/deciliter  glucagon  Injectable 1 milliGRAM(s) IntraMuscular once PRN Glucose LESS THAN 70 milligrams/deciliter  guaiFENesin   Syrup  (Sugar-Free) 100 milliGRAM(s) Oral every 6 hours PRN Cough      Allergies    adhesives (Rash)  No Known Drug Allergies    Intolerances        REVIEW OF SYSTEMS:    All other review of systems is negative unless indicated above    Vital Signs Last 24 Hrs  T(C): 36.5 (09 Jun 2018 04:45), Max: 37.4 (09 Jun 2018 00:00)  T(F): 97.7 (09 Jun 2018 04:45), Max: 99.3 (09 Jun 2018 00:00)  HR: 67 (09 Jun 2018 04:45) (60 - 88)  BP: 190/69 (09 Jun 2018 04:45) (150/82 - 216/71)  BP(mean): --  RR: 18 (09 Jun 2018 04:45) (17 - 20)  SpO2: 95% (09 Jun 2018 04:45) (94% - 97%)    I&O's Summary    08 Jun 2018 07:01  -  09 Jun 2018 07:00  --------------------------------------------------------  IN: 300 mL / OUT: 0 mL / NET: 300 mL        PHYSICAL EXAM:    Constitutional: NAD, awake and alert, well-developed  Eyes:  EOMI,  Pupils round, No oral cyanosis.  HEENT: No exudate or erythema  Pulmonary: Non-labored, breath sounds are clear bilaterally, No wheezing, rales or rhonchi  Cardiovascular: Regular, S1 and S2, mild systolic murmur  Gastrointestinal: Bowel Sounds present, soft, nontender morbidly obese.   Ext: 3 - 4+ LE edema up to thighs, Left lower leg deformity, chronic venous stasis changes  Neurological: Alert, no gross focal motor deficits  Skin: No rashes.  Psych:  Mood & affect appropriate    LABS: All Labs Reviewed:                        11.5   4.29  )-----------( 214      ( 08 Jun 2018 06:24 )             36.7                         13.1   5.30  )-----------( 212      ( 07 Jun 2018 19:59 )             41.3     08 Jun 2018 06:24    144    |  114    |  60     ----------------------------<  66     6.0     |  23     |  3.10   07 Jun 2018 19:59    143    |  115    |  60     ----------------------------<  83     6.1     |  18     |  3.10     Ca    8.2        08 Jun 2018 06:24  Ca    8.6        07 Jun 2018 19:59    TPro  7.1    /  Alb  3.2    /  TBili  0.5    /  DBili  x      /  AST  21     /  ALT  22     /  AlkPhos  94     07 Jun 2018 19:59    PT/INR - ( 07 Jun 2018 19:59 )   PT: 14.5 sec;   INR: 1.32 ratio         PTT - ( 07 Jun 2018 19:59 )  PTT:35.8 sec  CARDIAC MARKERS ( 08 Jun 2018 06:24 )  x     / x     / 176 U/L / x     / x      CARDIAC MARKERS ( 07 Jun 2018 19:59 )  .025 ng/mL / x     / x     / x     / 4.8 ng/mL      Blood Culture:   06-07 @ 19:59  Pro Bnp [id number]    06-08 @ 10:09  TSH: 2.04      RADIOLOGY/EKG:    < from: TTE Echo Doppler w/o Cont (06.08.18 @ 13:56) >  Conclusion:   1. This is a very technically limited study.  2. There is normal left ventricular size and probable left ventricular   systolic function with an ejection fraction greater than 55%.  3. There is diastolic dysfunction.  4. There is mild left ventricular hypertrophy.  5. Given limited endocardial definition, wall motion abnormalities could   not be excluded on this study.  6. The aortic valve appears mildly calcified with mild aortic valve   stenosis with a mean aortic valve gradient of 16 mmHg and an aortic valve   area of 1.3.  7. There is mild tricuspid regurgitation. The right ventricular systolic   pressure could not be calculated given a low velocity.  8. There is moderate left atrial enlargement.  9. There is a small left-sided pleural effusion.    < end of copied text >    Attending Attestation:   20 minutes spent on total encounter; more than 50% of the visit was spent counseling and/or coordinating care by the attending physician.     ASSESSMENT AND PLAN

## 2018-06-09 NOTE — PROGRESS NOTE ADULT - SUBJECTIVE AND OBJECTIVE BOX
pt  seen on rounds sitting up alet bp still elevated 168/90 lungs poor  effort  cor  regular rate abdomen morbid  obesity extremities chronic  changes creat 2.9 to  follow cmp and check tsh as pt   still on iv  antibiotics  and iv  lasix

## 2018-06-09 NOTE — PROGRESS NOTE ADULT - ASSESSMENT
67 yo m ,  ,plus kids , lives with wife , works from home , has hx of hypertension , osteoarthritis , and hyperlipidemia , and ckd stage 3 , and DM T2, and hx of foot surgery , and chronic edema and lymphedema in legs , and presents with a few weeks of increasing edema and poor dm control and shortness of breath and now with inability to void , and increased weakness,   denies cp , fever, chills , nausea a, vomiting , head aches , (07 Jun 2018 21:33)    ECHO 8/5/16; TDS, probably normal EF, severe LVH, LAE  Lexiscan: 8/2016: Abnormal  CARDIAC CATH: 8/16/16: D2 50%, otherwise normal coronaries    RADIOLOGY/EKG: Diagnosis Line Sinus rhythm with 1st degree AV block  Poor R Wave Progression     2D ECHO: repeat pending  CXR: CHF    PLAN  1 Cont IV lasix  2   3 Would repeat CXR over the weekend  4 K 6.0, elevated CR. For Kayexalate  5 Renal Consult  6 No Ace/ARb  7 Observe on remote tele, stephanie overnight  8 Will follow here, pt sees me outpt as well  9 If BP elevation persists would add amlodipine 5 mg (No BB for now sec bradycardia overnight)  10 Would add ASA and statin for NON-OB CAD 67 yo m ,  ,plus kids , lives with wife , works from home , has hx of hypertension , osteoarthritis , and hyperlipidemia , and ckd stage 3 , and DM T2, and hx of foot surgery with severe deformity limiting ambulation, and chronic edema and lymphedema in legs , and presents with a few weeks of increasing edema and poor dm control and shortness of breath and now with inability to void , and increased weakness,   denies cp , fever, chills , nausea a, vomiting , head aches , (07 Jun 2018 21:33)    ECHO 8/5/16; TDS, probably normal EF, severe LVH, LAE  Lexiscan: 8/2016: Abnormal  CARDIAC CATH: 8/16/16: D2 50%, otherwise normal coronaries    RADIOLOGY/EKG: Diagnosis Line Sinus rhythm with 1st degree AV block  Poor R Wave Progression     CXR: CHF    PLAN  1 Cont IV lasix  2 Would repeat CXR/  4 K 6.0, elevated CR. For Kayexalate  5 Renal Consult  6 No Ace/ARb  7 Observe on remote tele, stephanie overnight  8 Will follow here, pt sees me outpt as well  9 If BP elevation persists would add amlodipine 5 mg (No BB for now sec bradycardia overnight)  10 Would add ASA and statin for NON-OB CAD 69 yo m ,  ,plus kids , lives with wife , works from home , has hx of hypertension , osteoarthritis , and hyperlipidemia , and ckd stage 3 , and DM T2, and hx of foot surgery with severe deformity limiting ambulation, and chronic edema and lymphedema in legs , and presents with a few weeks of increasing edema and poor dm control and shortness of breath and now with inability to void , and increased weakness consistent with Acute on Chronic Diastolic CHF and URI.       ECHO 8/5/16; TDS, probably normal EF, severe LVH, LAE  Lexiscan: 8/2016: Abnormal  CARDIAC CATH: 8/16/16: D2 50%, otherwise normal coronaries    RADIOLOGY/EKG: Diagnosis Line Sinus rhythm with 1st degree AV block  Poor R Wave Progression     CXR: CHF    PLAN  1 Cont IV lasix but increase to 40 bid  2 Would repeat CXR/BNP  4 K 6.0 yest, todays is 5.2 with elevated CR. For Kayexalate as needed  5 Renal Consult  6 No Ace/ARb  7 Observe on remote tele, short brief stephanie overnight with 2:1 heart block  8. Decrease amlodipine to 5mg given side effect of increased LE edema  9 Add Toprol 50mg daily and continue with Hydralazine 50mg 4 x day and Isordil 20 4 x day  10 Would add ASA and statin for NON-OB CAD  11. DVT prophylaxis

## 2018-06-10 LAB
ALBUMIN SERPL ELPH-MCNC: 2.7 G/DL — LOW (ref 3.3–5)
ALP SERPL-CCNC: 85 U/L — SIGNIFICANT CHANGE UP (ref 40–120)
ALT FLD-CCNC: 13 U/L — SIGNIFICANT CHANGE UP (ref 12–78)
ANION GAP SERPL CALC-SCNC: 9 MMOL/L — SIGNIFICANT CHANGE UP (ref 5–17)
AST SERPL-CCNC: 13 U/L — LOW (ref 15–37)
BILIRUB SERPL-MCNC: 0.4 MG/DL — SIGNIFICANT CHANGE UP (ref 0.2–1.2)
BUN SERPL-MCNC: 55 MG/DL — HIGH (ref 7–23)
CALCIUM SERPL-MCNC: 8.2 MG/DL — LOW (ref 8.5–10.1)
CHLORIDE SERPL-SCNC: 112 MMOL/L — HIGH (ref 96–108)
CO2 SERPL-SCNC: 24 MMOL/L — SIGNIFICANT CHANGE UP (ref 22–31)
CREAT SERPL-MCNC: 3.1 MG/DL — HIGH (ref 0.5–1.3)
GLUCOSE SERPL-MCNC: 92 MG/DL — SIGNIFICANT CHANGE UP (ref 70–99)
POTASSIUM SERPL-MCNC: 4.6 MMOL/L — SIGNIFICANT CHANGE UP (ref 3.5–5.3)
POTASSIUM SERPL-SCNC: 4.6 MMOL/L — SIGNIFICANT CHANGE UP (ref 3.5–5.3)
PROT SERPL-MCNC: 6.4 G/DL — SIGNIFICANT CHANGE UP (ref 6–8.3)
SODIUM SERPL-SCNC: 145 MMOL/L — SIGNIFICANT CHANGE UP (ref 135–145)
TSH SERPL-MCNC: 2.03 UIU/ML — SIGNIFICANT CHANGE UP (ref 0.36–3.74)

## 2018-06-10 RX ORDER — METOPROLOL TARTRATE 50 MG
25 TABLET ORAL DAILY
Qty: 0 | Refills: 0 | Status: DISCONTINUED | OUTPATIENT
Start: 2018-06-10 | End: 2018-06-13

## 2018-06-10 RX ADMIN — AMLODIPINE BESYLATE 5 MILLIGRAM(S): 2.5 TABLET ORAL at 05:19

## 2018-06-10 RX ADMIN — Medication 50 MILLIGRAM(S): at 05:20

## 2018-06-10 RX ADMIN — ISOSORBIDE DINITRATE 20 MILLIGRAM(S): 5 TABLET ORAL at 05:19

## 2018-06-10 RX ADMIN — NYSTATIN CREAM 1 APPLICATION(S): 100000 CREAM TOPICAL at 17:14

## 2018-06-10 RX ADMIN — Medication 100 MILLIGRAM(S): at 17:22

## 2018-06-10 RX ADMIN — NYSTATIN CREAM 1 APPLICATION(S): 100000 CREAM TOPICAL at 05:20

## 2018-06-10 RX ADMIN — Medication 50 MILLIGRAM(S): at 17:14

## 2018-06-10 RX ADMIN — Medication 100 MILLIGRAM(S): at 05:19

## 2018-06-10 RX ADMIN — ATORVASTATIN CALCIUM 20 MILLIGRAM(S): 80 TABLET, FILM COATED ORAL at 21:29

## 2018-06-10 RX ADMIN — ISOSORBIDE DINITRATE 20 MILLIGRAM(S): 5 TABLET ORAL at 13:22

## 2018-06-10 RX ADMIN — Medication 40 MILLIGRAM(S): at 17:14

## 2018-06-10 RX ADMIN — ENOXAPARIN SODIUM 40 MILLIGRAM(S): 100 INJECTION SUBCUTANEOUS at 11:32

## 2018-06-10 RX ADMIN — Medication 25 MILLIGRAM(S): at 11:44

## 2018-06-10 RX ADMIN — Medication 81 MILLIGRAM(S): at 11:44

## 2018-06-10 RX ADMIN — Medication 40 MILLIGRAM(S): at 05:20

## 2018-06-10 RX ADMIN — Medication 50 MILLIGRAM(S): at 11:32

## 2018-06-10 RX ADMIN — ISOSORBIDE DINITRATE 20 MILLIGRAM(S): 5 TABLET ORAL at 21:29

## 2018-06-10 NOTE — PROGRESS NOTE ADULT - SUBJECTIVE AND OBJECTIVE BOX
Patient is a 68y Male whom presented to the hospital with ckd and venkata     pt seen and examined , his wife and son in the room , no complains       PAST MEDICAL & SURGICAL HISTORY:  BPH (benign prostatic hyperplasia)  Hyperlipemia  Hypertension  No significant past surgical history      MEDICATIONS  (STANDING):  amLODIPine   Tablet 5 milliGRAM(s) Oral daily  aspirin enteric coated 81 milliGRAM(s) Oral daily  atorvastatin 20 milliGRAM(s) Oral at bedtime  ceFAZolin   IVPB 1000 milliGRAM(s) IV Intermittent every 12 hours  dextrose 5%. 1000 milliLiter(s) (50 mL/Hr) IV Continuous <Continuous>  dextrose 50% Injectable 12.5 Gram(s) IV Push once  dextrose 50% Injectable 25 Gram(s) IV Push once  dextrose 50% Injectable 25 Gram(s) IV Push once  enoxaparin Injectable 40 milliGRAM(s) SubCutaneous daily  furosemide   Injectable 40 milliGRAM(s) IV Push two times a day  hydrALAZINE 50 milliGRAM(s) Oral every 6 hours  insulin lispro (HumaLOG) corrective regimen sliding scale   SubCutaneous three times a day before meals  isosorbide   dinitrate Tablet (ISORDIL) 20 milliGRAM(s) Oral three times a day  metoprolol succinate ER 25 milliGRAM(s) Oral daily  nystatin Powder 1 Application(s) Topical two times a day    MEDICATIONS  (PRN):  acetaminophen   Tablet. 650 milliGRAM(s) Oral every 6 hours PRN Mild Pain (1 - 3)  dextrose 40% Gel 15 Gram(s) Oral once PRN Blood Glucose LESS THAN 70 milliGRAM(s)/deciliter  glucagon  Injectable 1 milliGRAM(s) IntraMuscular once PRN Glucose LESS THAN 70 milligrams/deciliter  guaiFENesin   Syrup  (Sugar-Free) 100 milliGRAM(s) Oral every 6 hours PRN Cough            REVIEW OF SYSTEMS:    CONSTITUTIONAL: No weakness, fevers or chills  EYES/ENT: No visual changes;  no throat pain   NECK: No pain or stiffness  RESPIRATORY: No cough, wheezing, hemoptysis; No shortness of breath  CARDIOVASCULAR: No chest pain or palpitations  GASTROINTESTINAL: No abdominal or epigastric pain. No nausea, vomiting,     No diarrhea or constipation. No melena                                         11.8   5.69  )-----------( 213      ( 2018 07:23 )             38.3       CBC Full  -  ( 2018 07:23 )  WBC Count : 5.69 K/uL  Hemoglobin : 11.8 g/dL  Hematocrit : 38.3 %  Platelet Count - Automated : 213 K/uL  Mean Cell Volume : 93.9 fl  Mean Cell Hemoglobin : 28.9 pg  Mean Cell Hemoglobin Concentration : 30.8 gm/dL  Auto Neutrophil # : 4.49 K/uL  Auto Lymphocyte # : 0.46 K/uL  Auto Monocyte # : 0.47 K/uL  Auto Eosinophil # : 0.22 K/uL  Auto Basophil # : 0.04 K/uL  Auto Neutrophil % : 78.8 %  Auto Lymphocyte % : 8.1 %  Auto Monocyte % : 8.3 %  Auto Eosinophil % : 3.9 %  Auto Basophil % : 0.7 %      06-10    145  |  112<H>  |  55<H>  ----------------------------<  92  4.6   |  24  |  3.10<H>    Ca    8.2<L>      10 Alireza 2018 08:41  Phos  4.3     -09    TPro  6.4  /  Alb  2.7<L>  /  TBili  0.4  /  DBili  x   /  AST  13<L>  /  ALT  13  /  AlkPhos  85  06-10      CAPILLARY BLOOD GLUCOSE      POCT Blood Glucose.: 120 mg/dL (10 Alireza 2018 16:37)  POCT Blood Glucose.: 98 mg/dL (10 Alireza 2018 11:41)  POCT Blood Glucose.: 108 mg/dL (10 Alireza 2018 07:36)  POCT Blood Glucose.: 126 mg/dL (2018 20:54)      Vital Signs Last 24 Hrs  T(C): 36.4 (10 Alireza 2018 15:47), Max: 37.1 (2018 19:37)  T(F): 97.6 (10 Alireza 2018 15:47), Max: 98.8 (2018 19:37)  HR: 83 (10 Alireza 2018 15:47) (73 - 91)  BP: 164/83 (10 Alireza 2018 15:47) (162/60 - 185/84)  BP(mean): --  RR: 19 (10 Alireza 2018 15:47) (18 - 19)  SpO2: 95% (10 Alireza 2018 15:47) (95% - 98%)    Urinalysis Basic - ( 2018 07:39 )    Color: Yellow / Appearance: Clear / S.020 / pH: x  Gluc: x / Ketone: Trace  / Bili: Negative / Urobili: Negative   Blood: x / Protein: 500 mg/dL / Nitrite: Negative   Leuk Esterase: Negative / RBC: 3-5 /HPF / WBC 3-5   Sq Epi: x / Non Sq Epi: Moderate / Bacteria: Few                       PHYSICAL EXAM:    Constitutional: NAD  HEENT: conjunctive   clear   Neck:  No JVD  Respiratory: CTAB  Cardiovascular: S1 and S2  Gastrointestinal: BS+, soft, NT/ND  Extremities: pos 3 plus  peripheral edema  Neurological: A/O x 3, no focal deficits  Psychiatric: Normal mood, normal affect  : No Sidhu  Skin: No rashes  Access: Not applicable

## 2018-06-10 NOTE — PROGRESS NOTE ADULT - SUBJECTIVE AND OBJECTIVE BOX
pt  seen on rounds  sitting in bed vss afebrile  lungs essentially  clear  cor  regular  rate abdomen  obese  extremities  chronic  changes in  skin  creat 3.1 lasix  dose  adjusted by  cardio

## 2018-06-10 NOTE — PROGRESS NOTE ADULT - SUBJECTIVE AND OBJECTIVE BOX
[facility] Cardiology Progress Note [phone] (Dr. Washburn, Jayson, Teresita, Kimmie)    CHIEF COMPLAINT: Patient is a 68y old  Male who presents with a chief complaint of Increased Swelling in bilateral lower extremities (08 Jun 2018 00:17)      Follow Up Today: The patient denies any chest discomfort or shortness of breath.    HPI:  69 yo m ,  ,plus kids , lives with wife , works from home , has hx of hypertension , osteoarthritis , and hyperlipidemia , and ckd stage 3 , and DM T2, and hx of foot surgery , and chronic edema and lymphedema in legs , and presents with a few weeks of increasing edema and poor dm control and shortness of breath and now with inability to void , and increased weakness,   denies cp , fever, chills , nausea a, vomiting , head aches , (07 Jun 2018 21:33)      PAST MEDICAL & SURGICAL HISTORY:  BPH (benign prostatic hyperplasia)  Hyperlipemia  Hypertension  No significant past surgical history      MEDICATIONS  (STANDING):  amLODIPine   Tablet 5 milliGRAM(s) Oral daily  aspirin enteric coated 81 milliGRAM(s) Oral daily  atorvastatin 20 milliGRAM(s) Oral at bedtime  ceFAZolin   IVPB 1000 milliGRAM(s) IV Intermittent every 12 hours  dextrose 5%. 1000 milliLiter(s) (50 mL/Hr) IV Continuous <Continuous>  dextrose 50% Injectable 12.5 Gram(s) IV Push once  dextrose 50% Injectable 25 Gram(s) IV Push once  dextrose 50% Injectable 25 Gram(s) IV Push once  enoxaparin Injectable 40 milliGRAM(s) SubCutaneous daily  furosemide   Injectable 40 milliGRAM(s) IV Push two times a day  hydrALAZINE 50 milliGRAM(s) Oral every 6 hours  insulin lispro (HumaLOG) corrective regimen sliding scale   SubCutaneous three times a day before meals  isosorbide   dinitrate Tablet (ISORDIL) 20 milliGRAM(s) Oral three times a day  nystatin Powder 1 Application(s) Topical two times a day    MEDICATIONS  (PRN):  acetaminophen   Tablet. 650 milliGRAM(s) Oral every 6 hours PRN Mild Pain (1 - 3)  dextrose 40% Gel 15 Gram(s) Oral once PRN Blood Glucose LESS THAN 70 milliGRAM(s)/deciliter  glucagon  Injectable 1 milliGRAM(s) IntraMuscular once PRN Glucose LESS THAN 70 milligrams/deciliter  guaiFENesin   Syrup  (Sugar-Free) 100 milliGRAM(s) Oral every 6 hours PRN Cough      Allergies    adhesives (Rash)  No Known Drug Allergies    Intolerances        REVIEW OF SYSTEMS:    All other review of systems is negative unless indicated above    Vital Signs Last 24 Hrs  T(C): 36.8 (10 Alireza 2018 07:28), Max: 37.1 (09 Jun 2018 19:37)  T(F): 98.2 (10 Alireza 2018 07:28), Max: 98.8 (09 Jun 2018 19:37)  HR: 76 (10 Alireza 2018 07:28) (67 - 79)  BP: 165/66 (10 Alireza 2018 07:28) (154/82 - 170/80)  BP(mean): --  RR: 19 (10 Alireza 2018 07:28) (18 - 19)  SpO2: 96% (10 Alireza 2018 07:28) (95% - 96%)    I&O's Summary    09 Jun 2018 07:01  -  10 Alireza 2018 07:00  --------------------------------------------------------  IN: 800 mL / OUT: 0 mL / NET: 800 mL        PHYSICAL EXAM:    Constitutional: NAD, awake and alert, well-developed  Eyes:  EOMI,  Pupils round, No oral cyanosis.  HEENT: No exudate or erythema  Pulmonary: Non-labored, breath sounds are clear bilaterally, No wheezing, rales or rhonchi  Cardiovascular: Regular, S1 and S2, mild systolic murmur  Gastrointestinal: Bowel Sounds present, soft, nontender morbidly obese.   Ext: 3 - 4+ LE edema up to thighs, Left lower leg deformity, chronic venous stasis changes  Neurological: Alert, no gross focal motor deficits  Skin: No rashes.  Psych:  Mood & affect appropriate    LABS: All Labs Reviewed:                        11.8   5.69  )-----------( 213      ( 09 Jun 2018 07:23 )             38.3                         11.5   4.29  )-----------( 214      ( 08 Jun 2018 06:24 )             36.7                         13.1   5.30  )-----------( 212      ( 07 Jun 2018 19:59 )             41.3     09 Jun 2018 07:23    145    |  114    |  54     ----------------------------<  91     5.2     |  24     |  2.90   08 Jun 2018 06:24    144    |  114    |  60     ----------------------------<  66     6.0     |  23     |  3.10   07 Jun 2018 19:59    143    |  115    |  60     ----------------------------<  83     6.1     |  18     |  3.10     Ca    8.0        09 Jun 2018 07:23  Ca    8.2        08 Jun 2018 06:24  Ca    8.6        07 Jun 2018 19:59  Phos  4.3       09 Jun 2018 07:23    TPro  6.0    /  Alb  2.6    /  TBili  0.4    /  DBili  .10    /  AST  18     /  ALT  16     /  AlkPhos  86     09 Jun 2018 07:23  TPro  7.1    /  Alb  3.2    /  TBili  0.5    /  DBili  x      /  AST  21     /  ALT  22     /  AlkPhos  94     07 Jun 2018 19:59          Blood Culture:   06-09 @ 10:06  Pro Bnp [id number]  06-07 @ 19:59  Pro Bnp [id number]    06-08 @ 10:09  TSH: 2.04      RADIOLOGY/EKG:  < from: TTE Echo Doppler w/o Cont (06.08.18 @ 13:56) >  Conclusion:   1. This is a very technically limited study.  2. There is normal left ventricular size and probable left ventricular   systolic function with an ejection fraction greater than 55%.  3. There is diastolic dysfunction.  4. There is mild left ventricular hypertrophy.  5. Given limited endocardial definition, wall motion abnormalities could   not be excluded on this study.  6. The aortic valve appears mildly calcified with mild aortic valve   stenosis with a mean aortic valve gradient of 16 mmHg and an aortic valve   area of 1.3.  7. There is mild tricuspid regurgitation. The right ventricular systolic   pressure could not be calculated given a low velocity.  8. There is moderate left atrial enlargement.  9. There is a small left-sided pleural effusion.    < end of copied text >  Attending Attestation:   20 minutes spent on total encounter; more than 50% of the visit was spent counseling and/or coordinating care by the attending physician.     ASSESSMENT AND PLAN [facility] Cardiology Progress Note [phone] (Dr. Washburn, Jayson, Teresita, Kimmie)    CHIEF COMPLAINT: Patient is a 68y old  Male who presents with a chief complaint of Increased Swelling in bilateral lower extremities (08 Jun 2018 00:17)      Follow Up Today: The patient denies any chest discomfort or shortness of breath. Sitting up, appears comfortable    HPI:  67 yo m ,  ,plus kids , lives with wife , works from home , has hx of hypertension , osteoarthritis , and hyperlipidemia , and ckd stage 3 , and DM T2, and hx of foot surgery , and chronic edema and lymphedema in legs, and presents with a few weeks of increasing edema and poor dm control and shortness of breath and now with inability to void , and increased weakness,   denies cp , fever, chills , nausea a, vomiting , head aches , (07 Jun 2018 21:33)      PAST MEDICAL & SURGICAL HISTORY:  BPH (benign prostatic hyperplasia)  Hyperlipemia  Hypertension  No significant past surgical history      MEDICATIONS  (STANDING):  amLODIPine   Tablet 5 milliGRAM(s) Oral daily  aspirin enteric coated 81 milliGRAM(s) Oral daily  atorvastatin 20 milliGRAM(s) Oral at bedtime  ceFAZolin   IVPB 1000 milliGRAM(s) IV Intermittent every 12 hours  dextrose 5%. 1000 milliLiter(s) (50 mL/Hr) IV Continuous <Continuous>  dextrose 50% Injectable 12.5 Gram(s) IV Push once  dextrose 50% Injectable 25 Gram(s) IV Push once  dextrose 50% Injectable 25 Gram(s) IV Push once  enoxaparin Injectable 40 milliGRAM(s) SubCutaneous daily  furosemide   Injectable 40 milliGRAM(s) IV Push two times a day  hydrALAZINE 50 milliGRAM(s) Oral every 6 hours  insulin lispro (HumaLOG) corrective regimen sliding scale   SubCutaneous three times a day before meals  isosorbide   dinitrate Tablet (ISORDIL) 20 milliGRAM(s) Oral three times a day  nystatin Powder 1 Application(s) Topical two times a day    MEDICATIONS  (PRN):  acetaminophen   Tablet. 650 milliGRAM(s) Oral every 6 hours PRN Mild Pain (1 - 3)  dextrose 40% Gel 15 Gram(s) Oral once PRN Blood Glucose LESS THAN 70 milliGRAM(s)/deciliter  glucagon  Injectable 1 milliGRAM(s) IntraMuscular once PRN Glucose LESS THAN 70 milligrams/deciliter  guaiFENesin   Syrup  (Sugar-Free) 100 milliGRAM(s) Oral every 6 hours PRN Cough      Allergies    adhesives (Rash)  No Known Drug Allergies    Intolerances        REVIEW OF SYSTEMS:    All other review of systems is negative unless indicated above    Vital Signs Last 24 Hrs  T(C): 36.8 (10 Alireza 2018 07:28), Max: 37.1 (09 Jun 2018 19:37)  T(F): 98.2 (10 Alireza 2018 07:28), Max: 98.8 (09 Jun 2018 19:37)  HR: 76 (10 Alireza 2018 07:28) (67 - 79)  BP: 165/66 (10 Alireza 2018 07:28) (154/82 - 170/80)  BP(mean): --  RR: 19 (10 Alireza 2018 07:28) (18 - 19)  SpO2: 96% (10 Alireza 2018 07:28) (95% - 96%)    I&O's Summary    09 Jun 2018 07:01  -  10 Alireza 2018 07:00  --------------------------------------------------------  IN: 800 mL / OUT: 0 mL / NET: 800 mL        PHYSICAL EXAM:    Constitutional: NAD, awake and alert, well-developed  Eyes:  EOMI,  Pupils round, No oral cyanosis.  HEENT: No exudate or erythema  Pulmonary: Non-labored, breath sounds are clear bilaterally, No wheezing, rales or rhonchi  Cardiovascular: Regular, S1 and S2, mild systolic murmur  Gastrointestinal: Bowel Sounds present, soft, nontender morbidly obese.   Ext: 3 - 4+ LE edema up to thighs, Left lower leg deformity, chronic venous stasis changes  Neurological: Alert, no gross focal motor deficits  Skin: No rashes.  Psych:  Mood & affect appropriate    LABS: All Labs Reviewed:                        11.8   5.69  )-----------( 213      ( 09 Jun 2018 07:23 )             38.3                         11.5   4.29  )-----------( 214      ( 08 Jun 2018 06:24 )             36.7                         13.1   5.30  )-----------( 212      ( 07 Jun 2018 19:59 )             41.3     09 Jun 2018 07:23    145    |  114    |  54     ----------------------------<  91     5.2     |  24     |  2.90   08 Jun 2018 06:24    144    |  114    |  60     ----------------------------<  66     6.0     |  23     |  3.10   07 Jun 2018 19:59    143    |  115    |  60     ----------------------------<  83     6.1     |  18     |  3.10     Ca    8.0        09 Jun 2018 07:23  Ca    8.2        08 Jun 2018 06:24  Ca    8.6        07 Jun 2018 19:59  Phos  4.3       09 Jun 2018 07:23    TPro  6.0    /  Alb  2.6    /  TBili  0.4    /  DBili  .10    /  AST  18     /  ALT  16     /  AlkPhos  86     09 Jun 2018 07:23  TPro  7.1    /  Alb  3.2    /  TBili  0.5    /  DBili  x      /  AST  21     /  ALT  22     /  AlkPhos  94     07 Jun 2018 19:59          Blood Culture:   06-09 @ 10:06  Pro Bnp [id number]  06-07 @ 19:59  Pro Bnp [id number]    06-08 @ 10:09  TSH: 2.04      RADIOLOGY/EKG:  < from: TTE Echo Doppler w/o Cont (06.08.18 @ 13:56) >  Conclusion:   1. This is a very technically limited study.  2. There is normal left ventricular size and probable left ventricular   systolic function with an ejection fraction greater than 55%.  3. There is diastolic dysfunction.  4. There is mild left ventricular hypertrophy.  5. Given limited endocardial definition, wall motion abnormalities could   not be excluded on this study.  6. The aortic valve appears mildly calcified with mild aortic valve   stenosis with a mean aortic valve gradient of 16 mmHg and an aortic valve   area of 1.3.  7. There is mild tricuspid regurgitation. The right ventricular systolic   pressure could not be calculated given a low velocity.  8. There is moderate left atrial enlargement.  9. There is a small left-sided pleural effusion.    < end of copied text >  Attending Attestation:   20 minutes spent on total encounter; more than 50% of the visit was spent counseling and/or coordinating care by the attending physician.     ASSESSMENT AND PLAN

## 2018-06-10 NOTE — PROGRESS NOTE ADULT - ASSESSMENT
69 yo m ,  ,plus kids , lives with wife , works from home , has hx of hypertension , osteoarthritis , and hyperlipidemia , and ckd stage 3 , and DM T2, and hx of foot surgery with severe deformity limiting ambulation, and chronic edema and lymphedema in legs , and presents with a few weeks of increasing edema and poor dm control and shortness of breath and now with inability to void , and increased weakness consistent with Acute on Chronic Diastolic CHF and URI.       ECHO 8/5/16; TDS, probably normal EF, severe LVH, LAE  Lexiscan: 8/2016: Abnormal  CARDIAC CATH: 8/16/16: D2 50%, otherwise normal coronaries    RADIOLOGY/EKG: Diagnosis Line Sinus rhythm with 1st degree AV block  Poor R Wave Progression     CXR: CHF    PLAN  1 Cont IV lasix but increase to 40 bid  2 Would repeat CXR/BNP  4 K 6.0 yest, todays is 5.2 with elevated CR. For Kayexalate as needed  5 Renal Consult  6 No Ace/ARb  7 Observe on remote tele, short brief stephanie overnight with 2:1 heart block  8. Decrease amlodipine to 5mg given side effect of increased LE edema  9 Add Toprol 50mg daily and continue with Hydralazine 50mg 4 x day and Isordil 20 4 x day  10 Would add ASA and statin for NON-OB CAD  11. DVT prophylaxis 69 yo m ,  ,plus kids , lives with wife , works from home , has hx of hypertension , osteoarthritis , and hyperlipidemia , and ckd stage 3 , and DM T2, and hx of foot surgery with severe deformity limiting ambulation, and chronic edema and lymphedema in legs , and presents with a few weeks of increasing edema and poor dm control and shortness of breath and now with inability to void , and increased weakness consistent with Acute on Chronic Diastolic CHF and URI.      Acute on chronic severe Diastolic heart failure in the setting of severe CKD.  Also with rare 2:1 AV block and poorly controlled hypertension.      ECHO 8/5/16; TDS, probably normal EF, severe LVH, LAE  Lexiscan: 8/2016: Abnormal  CARDIAC CATH: 8/16/16: D2 50%, otherwise normal coronaries    RADIOLOGY/EKG: Diagnosis Line Sinus rhythm with 1st degree AV block  Poor R Wave Progression     CXR: CHF    PLAN  1 Cont IV lasix but increase to 40 bid as per discussion with Dr. Mon. If no improvement in breathing with better o2 sats or swelling, then will consider lasix gtt.  Will monitor kidney function closely.  2 CXR no change with mild PVC and very small effusion, BNP is higher  3. No Ace/ARb  4 Observe on remote tele, short brief stephanie overnight with 2:1 heart block again asymptomatic  5. Continue amlodipine to 5mg given side effect of increased LE edema  6 Add Toprol 50mg daily and continue with Hydralazine 50mg 4 x day and Isordil 20 4 x day  7. Would add ASA and statin for NON-OB CAD  8. DVT prophylaxis  9. Discussed with renal, patient and patient's wife at length about plan spending greater than 60 minutes with phone calls, chart review and discussion about plan and alternatives.

## 2018-06-11 LAB
ANA PAT FLD IF-IMP: ABNORMAL
ANA TITR SER: ABNORMAL
ANION GAP SERPL CALC-SCNC: 7 MMOL/L — SIGNIFICANT CHANGE UP (ref 5–17)
BUN SERPL-MCNC: 58 MG/DL — HIGH (ref 7–23)
CALCIUM SERPL-MCNC: 8.1 MG/DL — LOW (ref 8.5–10.1)
CHLORIDE SERPL-SCNC: 111 MMOL/L — HIGH (ref 96–108)
CHOLEST SERPL-MCNC: 125 MG/DL — SIGNIFICANT CHANGE UP (ref 10–199)
CO2 SERPL-SCNC: 26 MMOL/L — SIGNIFICANT CHANGE UP (ref 22–31)
CREAT SERPL-MCNC: 3.1 MG/DL — HIGH (ref 0.5–1.3)
GLUCOSE SERPL-MCNC: 105 MG/DL — HIGH (ref 70–99)
HBA1C BLD-MCNC: 5.2 % — SIGNIFICANT CHANGE UP (ref 4–5.6)
HDLC SERPL-MCNC: 22 MG/DL — LOW (ref 40–125)
IGG SERPL-MCNC: 1067 MG/DL — SIGNIFICANT CHANGE UP (ref 700–1600)
IGG1 SER-MCNC: 398 MG/DL — SIGNIFICANT CHANGE UP (ref 248–810)
IGG2 SER-MCNC: 620 MG/DL — HIGH (ref 130–555)
IGG3 SER-MCNC: 94 MG/DL — SIGNIFICANT CHANGE UP (ref 15–102)
IGG4 SER-MCNC: 45 MG/DL — SIGNIFICANT CHANGE UP (ref 2–96)
INTERPRETATION 24H UR IFE-IMP: SIGNIFICANT CHANGE UP
LIPID PNL WITH DIRECT LDL SERPL: 84 MG/DL — SIGNIFICANT CHANGE UP
M PROTEIN 24H UR ELPH-MRATE: SIGNIFICANT CHANGE UP
POTASSIUM SERPL-MCNC: 5.3 MMOL/L — SIGNIFICANT CHANGE UP (ref 3.5–5.3)
POTASSIUM SERPL-SCNC: 5.3 MMOL/L — SIGNIFICANT CHANGE UP (ref 3.5–5.3)
SODIUM SERPL-SCNC: 144 MMOL/L — SIGNIFICANT CHANGE UP (ref 135–145)
TOTAL CHOLESTEROL/HDL RATIO MEASUREMENT: 5.7 RATIO — SIGNIFICANT CHANGE UP (ref 3.4–9.6)
TRIGL SERPL-MCNC: 96 MG/DL — SIGNIFICANT CHANGE UP (ref 10–149)

## 2018-06-11 RX ORDER — LACTOBACILLUS ACIDOPHILUS 100MM CELL
1 CAPSULE ORAL
Qty: 0 | Refills: 0 | Status: DISCONTINUED | OUTPATIENT
Start: 2018-06-11 | End: 2018-06-15

## 2018-06-11 RX ORDER — MAGNESIUM OXIDE 400 MG ORAL TABLET 241.3 MG
400 TABLET ORAL
Qty: 0 | Refills: 0 | Status: DISCONTINUED | OUTPATIENT
Start: 2018-06-11 | End: 2018-06-15

## 2018-06-11 RX ADMIN — Medication 50 MILLIGRAM(S): at 17:33

## 2018-06-11 RX ADMIN — ISOSORBIDE DINITRATE 20 MILLIGRAM(S): 5 TABLET ORAL at 05:01

## 2018-06-11 RX ADMIN — MAGNESIUM OXIDE 400 MG ORAL TABLET 400 MILLIGRAM(S): 241.3 TABLET ORAL at 12:12

## 2018-06-11 RX ADMIN — Medication 81 MILLIGRAM(S): at 12:20

## 2018-06-11 RX ADMIN — ISOSORBIDE DINITRATE 20 MILLIGRAM(S): 5 TABLET ORAL at 21:57

## 2018-06-11 RX ADMIN — Medication 100 MILLIGRAM(S): at 17:31

## 2018-06-11 RX ADMIN — Medication 50 MILLIGRAM(S): at 12:13

## 2018-06-11 RX ADMIN — Medication 40 MILLIGRAM(S): at 17:33

## 2018-06-11 RX ADMIN — Medication 100 MILLIGRAM(S): at 05:02

## 2018-06-11 RX ADMIN — Medication 50 MILLIGRAM(S): at 23:37

## 2018-06-11 RX ADMIN — Medication 50 MILLIGRAM(S): at 00:15

## 2018-06-11 RX ADMIN — MAGNESIUM OXIDE 400 MG ORAL TABLET 400 MILLIGRAM(S): 241.3 TABLET ORAL at 17:33

## 2018-06-11 RX ADMIN — Medication 50 MILLIGRAM(S): at 05:01

## 2018-06-11 RX ADMIN — NYSTATIN CREAM 1 APPLICATION(S): 100000 CREAM TOPICAL at 05:04

## 2018-06-11 RX ADMIN — ISOSORBIDE DINITRATE 20 MILLIGRAM(S): 5 TABLET ORAL at 13:38

## 2018-06-11 RX ADMIN — AMLODIPINE BESYLATE 5 MILLIGRAM(S): 2.5 TABLET ORAL at 05:01

## 2018-06-11 RX ADMIN — Medication 25 MILLIGRAM(S): at 05:01

## 2018-06-11 RX ADMIN — Medication 1 TABLET(S): at 12:11

## 2018-06-11 RX ADMIN — Medication 1 TABLET(S): at 12:20

## 2018-06-11 RX ADMIN — ENOXAPARIN SODIUM 40 MILLIGRAM(S): 100 INJECTION SUBCUTANEOUS at 12:12

## 2018-06-11 RX ADMIN — Medication 1 TABLET(S): at 17:33

## 2018-06-11 RX ADMIN — ATORVASTATIN CALCIUM 20 MILLIGRAM(S): 80 TABLET, FILM COATED ORAL at 21:57

## 2018-06-11 RX ADMIN — Medication 40 MILLIGRAM(S): at 05:02

## 2018-06-11 NOTE — PROGRESS NOTE ADULT - SUBJECTIVE AND OBJECTIVE BOX
Patient is a 68y Male whom presented to the hospital with ckd and venkata     pt seen and examined , his wife in the room , no complains       PAST MEDICAL & SURGICAL HISTORY:  BPH (benign prostatic hyperplasia)  Hyperlipemia  Hypertension  No significant past surgical history      MEDICATIONS  (STANDING):  amLODIPine   Tablet 5 milliGRAM(s) Oral daily  aspirin enteric coated 81 milliGRAM(s) Oral daily  atorvastatin 20 milliGRAM(s) Oral at bedtime  ceFAZolin   IVPB 1000 milliGRAM(s) IV Intermittent every 12 hours  dextrose 5%. 1000 milliLiter(s) (50 mL/Hr) IV Continuous <Continuous>  dextrose 50% Injectable 12.5 Gram(s) IV Push once  dextrose 50% Injectable 25 Gram(s) IV Push once  dextrose 50% Injectable 25 Gram(s) IV Push once  enoxaparin Injectable 40 milliGRAM(s) SubCutaneous daily  furosemide   Injectable 40 milliGRAM(s) IV Push two times a day  hydrALAZINE 50 milliGRAM(s) Oral every 6 hours  insulin lispro (HumaLOG) corrective regimen sliding scale   SubCutaneous three times a day before meals  isosorbide   dinitrate Tablet (ISORDIL) 20 milliGRAM(s) Oral three times a day  metoprolol succinate ER 25 milliGRAM(s) Oral daily  nystatin Powder 1 Application(s) Topical two times a day    MEDICATIONS  (PRN):  acetaminophen   Tablet. 650 milliGRAM(s) Oral every 6 hours PRN Mild Pain (1 - 3)  dextrose 40% Gel 15 Gram(s) Oral once PRN Blood Glucose LESS THAN 70 milliGRAM(s)/deciliter  glucagon  Injectable 1 milliGRAM(s) IntraMuscular once PRN Glucose LESS THAN 70 milligrams/deciliter  guaiFENesin   Syrup  (Sugar-Free) 100 milliGRAM(s) Oral every 6 hours PRN Cough            REVIEW OF SYSTEMS:    CONSTITUTIONAL: No weakness, fevers or chills  EYES/ENT: No visual changes;  no throat pain   NECK: No pain or stiffness  RESPIRATORY: No cough, wheezing, hemoptysis; No shortness of breath  CARDIOVASCULAR: No chest pain or palpitations  GASTROINTESTINAL: No abdominal or epigastric pain. No nausea, vomiting,     No diarrhea or constipation. No melena                                               06-11    144  |  111<H>  |  58<H>  ----------------------------<  105<H>  5.3   |  26  |  3.10<H>    Ca    8.1<L>      11 Jun 2018 06:16    TPro  6.4  /  Alb  2.7<L>  /  TBili  0.4  /  DBili  x   /  AST  13<L>  /  ALT  13  /  AlkPhos  85  06-10      CAPILLARY BLOOD GLUCOSE      POCT Blood Glucose.: 123 mg/dL (11 Jun 2018 11:31)  POCT Blood Glucose.: 95 mg/dL (11 Jun 2018 07:58)  POCT Blood Glucose.: 132 mg/dL (10 Alireza 2018 21:57)  POCT Blood Glucose.: 120 mg/dL (10 Alireza 2018 16:37)      Vital Signs Last 24 Hrs  T(C): 36.6 (11 Jun 2018 11:11), Max: 36.9 (10 Alireza 2018 19:51)  T(F): 97.9 (11 Jun 2018 11:11), Max: 98.5 (11 Jun 2018 04:25)  HR: 80 (11 Jun 2018 11:11) (60 - 83)  BP: 180/80 (11 Jun 2018 11:11) (120/80 - 180/80)  BP(mean): --  RR: 18 (11 Jun 2018 11:11) (18 - 19)  SpO2: 92% (11 Jun 2018 11:11) (91% - 98%)          PHYSICAL EXAM:    Constitutional: NAD  HEENT: conjunctive   clear   Neck:  No JVD  Respiratory: CTAB  Cardiovascular: S1 and S2  Gastrointestinal: BS+, soft, NT/ND  Extremities: pos 3 plus  peripheral edema  Neurological: A/O x 3, no focal deficits  Psychiatric: Normal mood, normal affect  : No Sidhu  Skin: No rashes  Access: Not applicable

## 2018-06-11 NOTE — PROGRESS NOTE ADULT - SUBJECTIVE AND OBJECTIVE BOX
[facility] Cardiology Progress Note [phone] (Dr. Washburn, Jayson, Teresita, Kimmie)    CHIEF COMPLAINT: Patient is a 68y old  Male who presents with a chief complaint of Increased Swelling in bilateral lower extremities (08 Jun 2018 00:17)      Follow Up Today: The patient denies any chest discomfort or shortness of breath.    HPI:  67 yo m ,  ,plus kids , lives with wife , works from home , has hx of hypertension , osteoarthritis , and hyperlipidemia , and ckd stage 3 , and DM T2, and hx of foot surgery , and chronic edema and lymphedema in legs , and presents with a few weeks of increasing edema and poor dm control and shortness of breath and now with inability to void , and increased weakness,   denies cp , fever, chills , nausea a, vomiting , head aches , (07 Jun 2018 21:33)      PAST MEDICAL & SURGICAL HISTORY:  BPH (benign prostatic hyperplasia)  Hyperlipemia  Hypertension  No significant past surgical history      MEDICATIONS  (STANDING):  amLODIPine   Tablet 5 milliGRAM(s) Oral daily  aspirin enteric coated 81 milliGRAM(s) Oral daily  atorvastatin 20 milliGRAM(s) Oral at bedtime  ceFAZolin   IVPB 1000 milliGRAM(s) IV Intermittent every 12 hours  dextrose 5%. 1000 milliLiter(s) (50 mL/Hr) IV Continuous <Continuous>  dextrose 50% Injectable 12.5 Gram(s) IV Push once  dextrose 50% Injectable 25 Gram(s) IV Push once  dextrose 50% Injectable 25 Gram(s) IV Push once  enoxaparin Injectable 40 milliGRAM(s) SubCutaneous daily  furosemide   Injectable 40 milliGRAM(s) IV Push two times a day  hydrALAZINE 50 milliGRAM(s) Oral every 6 hours  insulin lispro (HumaLOG) corrective regimen sliding scale   SubCutaneous three times a day before meals  isosorbide   dinitrate Tablet (ISORDIL) 20 milliGRAM(s) Oral three times a day  lactobacillus acidophilus 1 Tablet(s) Oral two times a day with meals  magnesium oxide 400 milliGRAM(s) Oral two times a day with meals  metoprolol succinate ER 25 milliGRAM(s) Oral daily  multivitamin 1 Tablet(s) Oral daily  nystatin Powder 1 Application(s) Topical two times a day    MEDICATIONS  (PRN):  acetaminophen   Tablet. 650 milliGRAM(s) Oral every 6 hours PRN Mild Pain (1 - 3)  dextrose 40% Gel 15 Gram(s) Oral once PRN Blood Glucose LESS THAN 70 milliGRAM(s)/deciliter  glucagon  Injectable 1 milliGRAM(s) IntraMuscular once PRN Glucose LESS THAN 70 milligrams/deciliter  guaiFENesin   Syrup  (Sugar-Free) 100 milliGRAM(s) Oral every 6 hours PRN Cough      Allergies    adhesives (Rash)  No Known Drug Allergies    Intolerances        REVIEW OF SYSTEMS:    All other review of systems is negative unless indicated above    Vital Signs Last 24 Hrs  T(C): 36.7 (11 Jun 2018 07:40), Max: 36.9 (10 Alireza 2018 19:51)  T(F): 98 (11 Jun 2018 07:40), Max: 98.5 (11 Jun 2018 04:25)  HR: 80 (11 Jun 2018 07:40) (60 - 91)  BP: 120/80 (11 Jun 2018 07:40) (120/80 - 185/84)  BP(mean): --  RR: 18 (11 Jun 2018 07:40) (18 - 19)  SpO2: 91% (11 Jun 2018 07:40) (91% - 98%)    I&O's Summary    10 Alireza 2018 07:01  -  11 Jun 2018 07:00  --------------------------------------------------------  IN: 910 mL / OUT: 0 mL / NET: 910 mL        PHYSICAL EXAM:    Constitutional: NAD, awake and alert, well-developed  Eyes:  EOMI,  Pupils round, No oral cyanosis.  HEENT: No exudate or erythema  Pulmonary: Non-labored, breath sounds are clear bilaterally, No wheezing, rales or rhonchi  Cardiovascular: Regular, S1 and S2, No murmurs, rubs, gallops oir clicks  Gastrointestinal: Bowel Sounds present, soft, nontender.   Ext: No significant LE edema with good pulses x 4  Neurological: Alert, no gross focal motor deficits  Skin: No rashes.  Psych:  Mood & affect appropriate    LABS: All Labs Reviewed:                        11.8   5.69  )-----------( 213      ( 09 Jun 2018 07:23 )             38.3     11 Jun 2018 06:16    144    |  111    |  58     ----------------------------<  105    5.3     |  26     |  3.10   10 Alireza 2018 08:41    145    |  112    |  55     ----------------------------<  92     4.6     |  24     |  3.10   09 Jun 2018 07:23    145    |  114    |  54     ----------------------------<  91     5.2     |  24     |  2.90     Ca    8.1        11 Jun 2018 06:16  Ca    8.2        10 Alireza 2018 08:41  Ca    8.0        09 Jun 2018 07:23  Phos  4.3       09 Jun 2018 07:23    TPro  6.4    /  Alb  2.7    /  TBili  0.4    /  DBili  x      /  AST  13     /  ALT  13     /  AlkPhos  85     10 Alireza 2018 08:41  TPro  6.0    /  Alb  2.6    /  TBili  0.4    /  DBili  .10    /  AST  18     /  ALT  16     /  AlkPhos  86     09 Jun 2018 07:23          Blood Culture:   06-09 @ 10:06  Pro Bnp [id number]    06-10 @ 08:41  TSH: 2.03  06-08 @ 10:09  TSH: 2.04      RADIOLOGY/EKG:    Attending Attestation:   20 minutes spent on total encounter; more than 50% of the visit was spent counseling and/or coordinating care by the attending physician.     ASSESSMENT AND PLAN [facility] Cardiology Progress Note [phone] (Dr. Washburn, Jayson, Teresita, Kimmie)    CHIEF COMPLAINT: Patient is a 68y old  Male who presents with a chief complaint of Increased Swelling in bilateral lower extremities (08 Jun 2018 00:17)      Follow Up Today: The patient denies any chest discomfort or shortness of breath. Feels legs are going down and he is urinating frequently    HPI:  67 yo m ,  ,plus kids , lives with wife , works from home , has hx of hypertension , osteoarthritis , and hyperlipidemia , and ckd stage 3 , and DM T2, and hx of foot surgery , and chronic edema and lymphedema in legs , and presents with a few weeks of increasing edema and poor dm control and shortness of breath and now with inability to void , and increased weakness,   denies cp , fever, chills , nausea a, vomiting , head aches , (07 Jun 2018 21:33)      PAST MEDICAL & SURGICAL HISTORY:  BPH (benign prostatic hyperplasia)  Hyperlipemia  Hypertension  No significant past surgical history      MEDICATIONS  (STANDING):  amLODIPine   Tablet 5 milliGRAM(s) Oral daily  aspirin enteric coated 81 milliGRAM(s) Oral daily  atorvastatin 20 milliGRAM(s) Oral at bedtime  ceFAZolin   IVPB 1000 milliGRAM(s) IV Intermittent every 12 hours  dextrose 5%. 1000 milliLiter(s) (50 mL/Hr) IV Continuous <Continuous>  dextrose 50% Injectable 12.5 Gram(s) IV Push once  dextrose 50% Injectable 25 Gram(s) IV Push once  dextrose 50% Injectable 25 Gram(s) IV Push once  enoxaparin Injectable 40 milliGRAM(s) SubCutaneous daily  furosemide   Injectable 40 milliGRAM(s) IV Push two times a day  hydrALAZINE 50 milliGRAM(s) Oral every 6 hours  insulin lispro (HumaLOG) corrective regimen sliding scale   SubCutaneous three times a day before meals  isosorbide   dinitrate Tablet (ISORDIL) 20 milliGRAM(s) Oral three times a day  lactobacillus acidophilus 1 Tablet(s) Oral two times a day with meals  magnesium oxide 400 milliGRAM(s) Oral two times a day with meals  metoprolol succinate ER 25 milliGRAM(s) Oral daily  multivitamin 1 Tablet(s) Oral daily  nystatin Powder 1 Application(s) Topical two times a day    MEDICATIONS  (PRN):  acetaminophen   Tablet. 650 milliGRAM(s) Oral every 6 hours PRN Mild Pain (1 - 3)  dextrose 40% Gel 15 Gram(s) Oral once PRN Blood Glucose LESS THAN 70 milliGRAM(s)/deciliter  glucagon  Injectable 1 milliGRAM(s) IntraMuscular once PRN Glucose LESS THAN 70 milligrams/deciliter  guaiFENesin   Syrup  (Sugar-Free) 100 milliGRAM(s) Oral every 6 hours PRN Cough      Allergies    adhesives (Rash)  No Known Drug Allergies    Intolerances        REVIEW OF SYSTEMS:    All other review of systems is negative unless indicated above    Vital Signs Last 24 Hrs  T(C): 36.7 (11 Jun 2018 07:40), Max: 36.9 (10 Alireza 2018 19:51)  T(F): 98 (11 Jun 2018 07:40), Max: 98.5 (11 Jun 2018 04:25)  HR: 80 (11 Jun 2018 07:40) (60 - 91)  BP: 120/80 (11 Jun 2018 07:40) (120/80 - 185/84)  BP(mean): --  RR: 18 (11 Jun 2018 07:40) (18 - 19)  SpO2: 91% (11 Jun 2018 07:40) (91% - 98%)    I&O's Summary    10 Alireza 2018 07:01  -  11 Jun 2018 07:00  --------------------------------------------------------  IN: 910 mL / OUT: 0 mL / NET: 910 mL        PHYSICAL EXAM:    Constitutional: NAD, awake and alert, well-developed  Eyes:  EOMI,  Pupils round, No oral cyanosis.  HEENT: No exudate or erythema  Pulmonary: Non-labored, breath sounds are clear bilaterally, No wheezing, rales or rhonchi  Cardiovascular: Regular, S1 and S2, Soft sys murmur  Gastrointestinal: Obese Bowel Sounds present, soft, nontender.   Ext: 4+ pitting edema into thighs with mild improvement  Neurological: Alert, no gross focal motor deficits  Skin: No rashes.  Psych:  Mood & affect appropriate    LABS: All Labs Reviewed:                        11.8   5.69  )-----------( 213      ( 09 Jun 2018 07:23 )             38.3     11 Jun 2018 06:16    144    |  111    |  58     ----------------------------<  105    5.3     |  26     |  3.10   10 Alireza 2018 08:41    145    |  112    |  55     ----------------------------<  92     4.6     |  24     |  3.10   09 Jun 2018 07:23    145    |  114    |  54     ----------------------------<  91     5.2     |  24     |  2.90     Ca    8.1        11 Jun 2018 06:16  Ca    8.2        10 Alireza 2018 08:41  Ca    8.0        09 Jun 2018 07:23  Phos  4.3       09 Jun 2018 07:23    TPro  6.4    /  Alb  2.7    /  TBili  0.4    /  DBili  x      /  AST  13     /  ALT  13     /  AlkPhos  85     10 Alireza 2018 08:41  TPro  6.0    /  Alb  2.6    /  TBili  0.4    /  DBili  .10    /  AST  18     /  ALT  16     /  AlkPhos  86     09 Jun 2018 07:23          Blood Culture:   06-09 @ 10:06  Pro Bnp [id number]    06-10 @ 08:41  TSH: 2.03  06-08 @ 10:09  TSH: 2.04      RADIOLOGY/EKG:    Attending Attestation:   20 minutes spent on total encounter; more than 50% of the visit was spent counseling and/or coordinating care by the attending physician.     ASSESSMENT AND PLAN

## 2018-06-11 NOTE — PROGRESS NOTE ADULT - ASSESSMENT
67 yo m ,  ,plus kids , lives with wife , works from home , has hx of hypertension , osteoarthritis , and hyperlipidemia , and ckd stage 3 , and DM T2, and hx of foot surgery with severe deformity limiting ambulation, and chronic edema and lymphedema in legs , and presents with a few weeks of increasing edema and poor dm control and shortness of breath and now with inability to void , and increased weakness consistent with Acute on Chronic Diastolic CHF and URI.      Acute on chronic severe Diastolic heart failure in the setting of severe CKD.  Also with rare 2:1 AV block and poorly controlled hypertension.      ECHO 8/5/16; TDS, probably normal EF, severe LVH, LAE  Lexiscan: 8/2016: Abnormal  CARDIAC CATH: 8/16/16: D2 50%, otherwise normal coronaries    RADIOLOGY/EKG: Diagnosis Line Sinus rhythm with 1st degree AV block  Poor R Wave Progression     CXR: CHF    PLAN  1 Cont IV lasix at 40 bid as per discussion with Dr. Mon. If no improvement in breathing with better o2 sats or swelling, then will consider lasix gtt.  Will monitor kidney function closely.  2 CXR no change with mild PVC and very small effusion, BNP is higher  3. No Ace/ARb  4 Observe on remote tele, short brief stephanie overnight with 2:1 heart block again asymptomatic  5. Continue amlodipine to 5mg given side effect of increased LE edema  6 Add Toprol 50mg daily and continue with Hydralazine 50mg 4 x day and Isordil 20 4 x day  7. Would add ASA and statin for NON-OB CAD  8. DVT prophylaxis  9. Discussed with renal, patient and patient's wife at length about plan spending greater than 60 minutes with phone calls, chart review and discussion about plan and alternatives. 69 yo m ,  ,plus kids , lives with wife , works from home , has hx of hypertension , osteoarthritis , and hyperlipidemia , and ckd stage 3 , and DM T2, and hx of foot surgery with severe deformity limiting ambulation, and chronic edema and lymphedema in legs , and presents with a few weeks of increasing edema and poor dm control and shortness of breath and now with inability to void , and increased weakness consistent with Acute on Chronic Diastolic CHF and URI.      Acute on chronic severe Diastolic heart failure in the setting of severe CKD.  Also with rare 2:1 AV block and poorly controlled hypertension.      ECHO 8/5/16; TDS, probably normal EF, severe LVH, LAE  Lexiscan: 8/2016: Abnormal  CARDIAC CATH: 8/16/16: D2 50%, otherwise normal coronaries    RADIOLOGY/EKG: Diagnosis Line Sinus rhythm with 1st degree AV block  Poor R Wave Progression     CXR: CHF    PLAN  1 Cont IV lasix at 40 bid as per discussion with Dr. Mon. If no improvement in 24 hours breathing with better o2 sats or swelling, then will consider lasix gtt.  Will monitor kidney function closely.  2. No Ace/ARb  3. Continue to observe on remote tele, short brief stephanie overnight with 2:1 heart block again asymptomatic  4. Continue amlodipine to 5mg given side effect of increased LE edema  5.  Add Toprol 50mg daily and continue with Hydralazine 50mg 4 x day and Isordil 20 4 x day  7. Would add ASA and statin for NON-OB CAD  8. DVT prophylaxis  9. Discussed with PMD this am about plan

## 2018-06-11 NOTE — PROGRESS NOTE ADULT - ASSESSMENT
69 yo m ,  ,plus kids , lives with wife , works from home , has hx of hypertension , osteoarthritis , and hyperlipidemia , and ckd stage 3 , and DM T2, and hx of foot surgery , and chronic edema and lymphedema in legs , and presents with a few weeks of increasing edema and poor dm control and shortness of breath and now with inability to void , and increased weakness,   denies cp , fever, chills , nausea a, vomiting , head aches ,   patient admitted for CINDY on CKD , and hyperkalemia , and diabetes mellitus type 2 , and edema and fluid retention , on lasix , nephro to follow ,   on IV abx for ? uti ,   low sugar this am , got glucose , , will adjust insulin  on 6/11/18 patient stable , edema decreasing on IV lasix ,   BP better on hydralazine , echo with valvular dx ,   check 24 hr urine r/o nephrotic syndrome , check lipids ,   resume current therapy and PT and and DVT and GI prophylax , 69 yo m ,  ,plus kids , lives with wife , works from home , has hx of hypertension , osteoarthritis , and hyperlipidemia , and ckd stage 3 , and DM T2, and hx of foot surgery , and chronic edema and lymphedema in legs , and presents with a few weeks of increasing edema and poor dm control and shortness of breath and now with inability to void , and increased weakness,   denies cp , fever, chills , nausea a, vomiting , head aches ,   patient admitted for CINDY on CKD , and hyperkalemia , and diabetes mellitus type 2 , and edema and fluid retention , on lasix , nephro to follow ,   on IV abx for ? uti ,   low sugar this am , got glucose , , will adjust insulin  on 6/11/18 patient stable , edema decreasing on IV lasix ,   BP better on hydralazine , echo with valvular dx ,   check 24 hr urine r/o nephrotic syndrome , check lipids ,   resume current therapy and PT and and DVT and GI prophylax ,   IV cefazolin for cellulitis , will dc in 2 days ,   Hepatitis profile an HIV negative

## 2018-06-11 NOTE — PROGRESS NOTE ADULT - SUBJECTIVE AND OBJECTIVE BOX
PCP  Subjective:   in bed , awake , alert , voiding , edema in legs decreased    Objective:   Vital Signs Last 24 Hrs  T(C): 36.7 (18 @ 07:40), Max: 36.9 (06-10-18 @ 19:51)  T(F): 98 (18 @ 07:40), Max: 98.5 (18 @ 04:25)  HR: 80 (18 @ 07:40) (60 - 91)  BP: 120/80 (18 @ 07:40) (120/80 - 185/84)  BP(mean): --  RR: 18 (18 @ 07:40) (18 - 19)  SpO2: 91% (18 @ 07:40) (91% - 98%)  Daily     Daily Weight in k.2 (2018 04:25)    GENERAL:  wdwn obese male , awake and alert , responsive ,   EYES: eomi jose  NECK: supple no masses  CHEST/LUNG: clear , some poor air movement in bases  HEART: s1 s2 regular ,   ABDOMEN:  obese , large panus , and rash under belly non tender , lymphedema in abdominal wall   EXTREMITIES:  edema pitting bilateral ,   SKIN:  some hyperpigmentation bilateral legs ,   CNS:  awake , alert non focal , ambulates, responsive ,   MSK: FROM in joints and no swelling     Allergies: Allergies    adhesives (Rash)  No Known Drug Allergies    Intolerances        Home Medications:  amLODIPine 5 mg oral tablet: 1 tab(s) orally once a day (2018 21:35)  aspirin 81 mg oral tablet: 1 tab(s) orally once a day (2018 18:32)  hydrALAZINE 25 mg oral tablet:  orally once a day (2018 18:32)  Lantus:  subcutaneous  (2018 18:32)  losartan 50 mg oral tablet: 1 tab(s) orally once a day (2018 18:32)  tamsulosin 0.4 mg oral capsule: 1 cap(s) orally once a day (2018 18:32)  verapamil 240 mg/12 hours oral tablet, extended release: 1 tab(s) orally once a day (in the morning) (2018 18:32)    Medications:   acetaminophen   Tablet. 650 milliGRAM(s) Oral every 6 hours PRN  amLODIPine   Tablet 5 milliGRAM(s) Oral daily  aspirin enteric coated 81 milliGRAM(s) Oral daily  atorvastatin 20 milliGRAM(s) Oral at bedtime  ceFAZolin   IVPB 1000 milliGRAM(s) IV Intermittent every 12 hours  dextrose 40% Gel 15 Gram(s) Oral once PRN  dextrose 5%. 1000 milliLiter(s) IV Continuous <Continuous>  dextrose 50% Injectable 12.5 Gram(s) IV Push once  dextrose 50% Injectable 25 Gram(s) IV Push once  dextrose 50% Injectable 25 Gram(s) IV Push once  enoxaparin Injectable 40 milliGRAM(s) SubCutaneous daily  furosemide   Injectable 40 milliGRAM(s) IV Push two times a day  glucagon  Injectable 1 milliGRAM(s) IntraMuscular once PRN  guaiFENesin   Syrup  (Sugar-Free) 100 milliGRAM(s) Oral every 6 hours PRN  hydrALAZINE 50 milliGRAM(s) Oral every 6 hours  insulin lispro (HumaLOG) corrective regimen sliding scale   SubCutaneous three times a day before meals  isosorbide   dinitrate Tablet (ISORDIL) 20 milliGRAM(s) Oral three times a day  metoprolol succinate ER 25 milliGRAM(s) Oral daily  nystatin Powder 1 Application(s) Topical two times a day      LABS:        144  |  111<H>  |  58<H>  ----------------------------<  105<H>  5.3   |  26  |  3.10<H>    Ca    8.1<L>      2018 06:16    TPro  6.4  /  Alb  2.7<L>  /  TBili  0.4  /  DBili  x   /  AST  13<L>  /  ALT  13  /  AlkPhos  85  06-10      06-07 @ 19:59  INR 1.32    < from: TTE Echo Doppler w/o Cont (18 @ 13:56) >     PROCEDURE DATE:  2018        INTERPRETATION:  INDICATION: Dyspnea    Conclusion:   1. This is a very technically limited study.  2. There is normal left ventricular size and probable left ventricular   systolic function with an ejection fraction greater than 55%.  3. There is diastolic dysfunction.  4. There is mild left ventricular hypertrophy.  5. Given limited endocardial definition, wall motion abnormalities could   not be excluded on this study.  6. The aortic valve appears mildly calcified with mild aortic valve   stenosis with a mean aortic valve gradient of 16 mmHg and an aortic valve   area of 1.3.  7. There is mild tricuspid regurgitation. The right ventricular systolic   pressure could not be calculated given a low velocity.  8. There is moderate left atrial enlargement.  9. There is a small left-sided pleural effusion.      Blood Pressure 175/69 mmHg         BSA 2.72 sq m    Dimensions:    LA 5.5 cm       Normal Values: 2.0 - 4.0 cm    Ao 3.5 cm        Normal Values: 2.0 - 3.8 cm  IVSd 1.3 cm       Normal Values: 0.6 - 1.2 cm  PWd 1.3 cm       Normal Values: 0.6 - 1.1 cm  LVIDd 5.1 cm         Normal Values: 3.0 - 5.6 cm  LVIDs 2.4 cm         Normal Values: 1.8 - 4.0 cm      < end of copied text >      CAPILLARY BLOOD GLUCOSE      POCT Blood Glucose.: 132 mg/dL (10 Alireza 2018 21:57)  POCT Blood Glucose.: 120 mg/dL (10 Alireza 2018 16:37)  POCT Blood Glucose.: 98 mg/dL (10 Alireza 2018 11:41)          RECENT CULTURES:

## 2018-06-12 LAB
ANION GAP SERPL CALC-SCNC: 10 MMOL/L — SIGNIFICANT CHANGE UP (ref 5–17)
BUN SERPL-MCNC: 59 MG/DL — HIGH (ref 7–23)
CALCIUM SERPL-MCNC: 8.3 MG/DL — LOW (ref 8.5–10.1)
CHLORIDE SERPL-SCNC: 109 MMOL/L — HIGH (ref 96–108)
CO2 SERPL-SCNC: 24 MMOL/L — SIGNIFICANT CHANGE UP (ref 22–31)
COLLECT DURATION TIME UR: 24 HR — SIGNIFICANT CHANGE UP
CREAT SERPL-MCNC: 2.8 MG/DL — HIGH (ref 0.5–1.3)
GLUCOSE SERPL-MCNC: 110 MG/DL — HIGH (ref 70–99)
POTASSIUM SERPL-MCNC: 4.5 MMOL/L — SIGNIFICANT CHANGE UP (ref 3.5–5.3)
POTASSIUM SERPL-SCNC: 4.5 MMOL/L — SIGNIFICANT CHANGE UP (ref 3.5–5.3)
PROT 24H UR-MRATE: 8159 MG/24 H — HIGH (ref 50–100)
SODIUM SERPL-SCNC: 143 MMOL/L — SIGNIFICANT CHANGE UP (ref 135–145)
TOTAL VOLUME - 24 HOUR: 4100 ML — SIGNIFICANT CHANGE UP
URINE CREATININE CALCULATION: 1.5 G/24 H — SIGNIFICANT CHANGE UP (ref 1–2)

## 2018-06-12 RX ORDER — ERGOCALCIFEROL 1.25 MG/1
50000 CAPSULE ORAL
Qty: 0 | Refills: 0 | Status: DISCONTINUED | OUTPATIENT
Start: 2018-06-12 | End: 2018-06-15

## 2018-06-12 RX ADMIN — AMLODIPINE BESYLATE 5 MILLIGRAM(S): 2.5 TABLET ORAL at 05:33

## 2018-06-12 RX ADMIN — ISOSORBIDE DINITRATE 20 MILLIGRAM(S): 5 TABLET ORAL at 21:27

## 2018-06-12 RX ADMIN — Medication 50 MILLIGRAM(S): at 11:43

## 2018-06-12 RX ADMIN — Medication 100 MILLIGRAM(S): at 17:27

## 2018-06-12 RX ADMIN — Medication 50 MILLIGRAM(S): at 05:34

## 2018-06-12 RX ADMIN — Medication 40 MILLIGRAM(S): at 05:35

## 2018-06-12 RX ADMIN — ATORVASTATIN CALCIUM 20 MILLIGRAM(S): 80 TABLET, FILM COATED ORAL at 21:27

## 2018-06-12 RX ADMIN — Medication 50 MILLIGRAM(S): at 17:28

## 2018-06-12 RX ADMIN — Medication 81 MILLIGRAM(S): at 11:43

## 2018-06-12 RX ADMIN — Medication 40 MILLIGRAM(S): at 17:28

## 2018-06-12 RX ADMIN — NYSTATIN CREAM 1 APPLICATION(S): 100000 CREAM TOPICAL at 17:29

## 2018-06-12 RX ADMIN — Medication 100 MILLIGRAM(S): at 05:35

## 2018-06-12 RX ADMIN — ENOXAPARIN SODIUM 40 MILLIGRAM(S): 100 INJECTION SUBCUTANEOUS at 11:43

## 2018-06-12 RX ADMIN — Medication 1 TABLET(S): at 08:06

## 2018-06-12 RX ADMIN — NYSTATIN CREAM 1 APPLICATION(S): 100000 CREAM TOPICAL at 05:32

## 2018-06-12 RX ADMIN — Medication 1 TABLET(S): at 17:28

## 2018-06-12 RX ADMIN — MAGNESIUM OXIDE 400 MG ORAL TABLET 400 MILLIGRAM(S): 241.3 TABLET ORAL at 08:06

## 2018-06-12 RX ADMIN — Medication 25 MILLIGRAM(S): at 05:34

## 2018-06-12 RX ADMIN — ISOSORBIDE DINITRATE 20 MILLIGRAM(S): 5 TABLET ORAL at 05:34

## 2018-06-12 RX ADMIN — Medication 2: at 11:43

## 2018-06-12 RX ADMIN — Medication 1 TABLET(S): at 11:43

## 2018-06-12 RX ADMIN — ISOSORBIDE DINITRATE 20 MILLIGRAM(S): 5 TABLET ORAL at 14:14

## 2018-06-12 RX ADMIN — Medication 50 MILLIGRAM(S): at 23:12

## 2018-06-12 RX ADMIN — MAGNESIUM OXIDE 400 MG ORAL TABLET 400 MILLIGRAM(S): 241.3 TABLET ORAL at 17:28

## 2018-06-12 RX ADMIN — NYSTATIN CREAM 1 APPLICATION(S): 100000 CREAM TOPICAL at 21:28

## 2018-06-12 NOTE — PHYSICAL THERAPY INITIAL EVALUATION ADULT - IMPAIRMENTS CONTRIBUTING TO GAIT DEVIATIONS, PT EVAL
impaired balance/decreased strength/Pt slightly off balance at times & decreased ROM in left ankle./decreased ROM

## 2018-06-12 NOTE — DIETITIAN INITIAL EVALUATION ADULT. - SIGNS/SYMPTOMS
as evidenced by elevated BUN Creat with CKD, CINDY as evidenced BMI grade 3 infrequent physical activity and estimated excessive energy intake

## 2018-06-12 NOTE — PROGRESS NOTE ADULT - ASSESSMENT
· Assessment		  67 yo m ,  ,plus kids , lives with wife , works from home , has hx of hypertension , osteoarthritis , and hyperlipidemia , and ckd stage 3 , and DM T2, and hx of foot surgery , and chronic edema and lymphedema in legs , and presents with a few weeks of increasing edema and poor dm control and shortness of breath and now with inability to void , and increased weakness,   denies cp , fever, chills , nausea a, vomiting , head aches ,   patient admitted for CINDY on CKD , and hyperkalemia , and diabetes mellitus type 2 , and edema and fluid retention , on lasix , nephro to follow ,   on IV abx for ? uti ,   low sugar this am , got glucose , , will adjust insulin    continue aggressive diuresis , and follow labs , and weight      Problem/Plan - 1:  ·  Problem: Acute renal failure, unspecified acute renal failure type.  Plan: iv fluids , nephro consult.      Problem/Plan - 2:  ·  Problem: Hyperkalemia.      Problem/Plan - 3:  ·  Problem: Hypervolemia, unspecified hypervolemia type.      Problem/Plan - 4:  ·  Problem: Urinary tract infection without hematuria, site unspecified.      Problem/Plan - 5:  ·  Problem: BPH (benign prostatic hyperplasia).      Problem/Plan - 6:  Problem: Mixed hyperlipidemia.     Problem/Plan - 7:  ·  Problem: Renovascular hypertension.  Plan: bp meds.      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  Plan: fs coverage.      Problem/Plan - 9:  ·  Problem: Neuropathy due to type 2 diabetes mellitus.     Attending Attestation:   I was physically present for the key portions of the evaluation and management (E/M) service provided.  I agree with the above history, physical, and plan which I have reviewed and edited where appropriate.

## 2018-06-12 NOTE — PHYSICAL THERAPY INITIAL EVALUATION ADULT - ADDITIONAL COMMENTS
Pt lives at home in a private house /c wife & 3 NOE /c hand rails & 1 flight of steps inside to his bedroom /c one rail. Pt was independent /c all functional mobility and ADLs prior to admission. Pt drives and does have a wheel chair. Pt states he uses wheel chair for long distance trips such as vacations.

## 2018-06-12 NOTE — PROGRESS NOTE ADULT - SUBJECTIVE AND OBJECTIVE BOX
Patient is a 68y Male whom presented to the hospital with ckd and venkata     pt seen and examined , no complains , no cp       PAST MEDICAL & SURGICAL HISTORY:  BPH (benign prostatic hyperplasia)  Hyperlipemia  Hypertension  No significant past surgical history      MEDICATIONS  (STANDING):  amLODIPine   Tablet 5 milliGRAM(s) Oral daily  aspirin enteric coated 81 milliGRAM(s) Oral daily  atorvastatin 20 milliGRAM(s) Oral at bedtime  ceFAZolin   IVPB 1000 milliGRAM(s) IV Intermittent every 12 hours  dextrose 5%. 1000 milliLiter(s) (50 mL/Hr) IV Continuous <Continuous>  dextrose 50% Injectable 12.5 Gram(s) IV Push once  dextrose 50% Injectable 25 Gram(s) IV Push once  dextrose 50% Injectable 25 Gram(s) IV Push once  enoxaparin Injectable 40 milliGRAM(s) SubCutaneous daily  furosemide   Injectable 40 milliGRAM(s) IV Push two times a day  hydrALAZINE 50 milliGRAM(s) Oral every 6 hours  insulin lispro (HumaLOG) corrective regimen sliding scale   SubCutaneous three times a day before meals  isosorbide   dinitrate Tablet (ISORDIL) 20 milliGRAM(s) Oral three times a day  metoprolol succinate ER 25 milliGRAM(s) Oral daily  nystatin Powder 1 Application(s) Topical two times a day    MEDICATIONS  (PRN):  acetaminophen   Tablet. 650 milliGRAM(s) Oral every 6 hours PRN Mild Pain (1 - 3)  dextrose 40% Gel 15 Gram(s) Oral once PRN Blood Glucose LESS THAN 70 milliGRAM(s)/deciliter  glucagon  Injectable 1 milliGRAM(s) IntraMuscular once PRN Glucose LESS THAN 70 milligrams/deciliter  guaiFENesin   Syrup  (Sugar-Free) 100 milliGRAM(s) Oral every 6 hours PRN Cough            REVIEW OF SYSTEMS:    CONSTITUTIONAL: No weakness, fevers or chills  NECK: No pain or stiffness  RESPIRATORY: No cough, wheezing, hemoptysis; No shortness of breath  CARDIOVASCULAR: No chest pain or palpitations  GASTROINTESTINAL: No abdominal or epigastric pain. No nausea, vomiting,     No diarrhea or constipation. No melena                                                   06-12    143  |  109<H>  |  59<H>  ----------------------------<  110<H>  4.5   |  24  |  2.80<H>    Ca    8.3<L>      12 Jun 2018 05:51        CAPILLARY BLOOD GLUCOSE      POCT Blood Glucose.: 165 mg/dL (12 Jun 2018 11:17)  POCT Blood Glucose.: 118 mg/dL (12 Jun 2018 07:43)  POCT Blood Glucose.: 121 mg/dL (11 Jun 2018 22:00)  POCT Blood Glucose.: 120 mg/dL (11 Jun 2018 16:44)      Vital Signs Last 24 Hrs  T(C): 37 (12 Jun 2018 16:08), Max: 37.1 (12 Jun 2018 07:31)  T(F): 98.6 (12 Jun 2018 16:08), Max: 98.7 (12 Jun 2018 07:31)  HR: 82 (12 Jun 2018 16:08) (66 - 87)  BP: 142/60 (12 Jun 2018 16:08) (142/60 - 184/76)  BP(mean): --  RR: 17 (12 Jun 2018 16:08) (17 - 19)  SpO2: 98% (12 Jun 2018 16:08) (91% - 98%)                  PHYSICAL EXAM:    Constitutional: NAD  HEENT: conjunctive   clear   Neck:  No JVD  Respiratory: CTAB  Cardiovascular: S1 and S2  Gastrointestinal: BS+, soft, NT/ND  Extremities: pos 3 plus  peripheral edema  Neurological: A/O x 3, no focal deficits  Psychiatric: Normal mood, normal affect  : No Sidhu  Skin: No rashes  Access: Not applicable

## 2018-06-12 NOTE — DIETITIAN INITIAL EVALUATION ADULT. - OTHER INFO
patient reports with good PO intake. states wife wants copy of diet for CINDY. patient states wt was 400# then down to 300# and over last year wt increased states 339# although bedscale wt 386.2# + 3 edema noted. states K was high PTA avoiding high K foods, wants to know how much protein to eat. diet reviewed at this time. good compliance with DM meds on insulin PTA and diet.

## 2018-06-12 NOTE — PHYSICAL THERAPY INITIAL EVALUATION ADULT - PERTINENT HX OF CURRENT PROBLEM, REHAB EVAL
As per H&P: "presents with a few weeks of increasing edema and poor dm control and shortness of breath and now with inability to void , and increased weakness."

## 2018-06-12 NOTE — PROGRESS NOTE ADULT - SUBJECTIVE AND OBJECTIVE BOX
PCP  Subjective:   in bed stable    Objective:   Vital Signs Last 24 Hrs  T(C): 37.1 (06-12-18 @ 07:31), Max: 37.1 (06-12-18 @ 07:31)  T(F): 98.7 (06-12-18 @ 07:31), Max: 98.7 (06-12-18 @ 07:31)  HR: 82 (06-12-18 @ 07:31) (66 - 87)  BP: 142/62 (06-12-18 @ 07:31) (142/62 - 198/71)  BP(mean): --  RR: 19 (06-12-18 @ 07:31) (18 - 19)  SpO2: 91% (06-12-18 @ 07:31) (91% - 95%)  Daily     Daily       GENERAL:  wdwn obese male , awake and alert , responsive ,   EYES: eomi jose  NECK: supple no masses  CHEST/LUNG: clear , some poor air movement in bases  HEART: s1 s2 regular ,   ABDOMEN:  obese , large panus , and rash under belly non tender , lymphedema in abdominal wall   EXTREMITIES:  edema pitting bilateral ,   SKIN:  some hyperpigmentation bilateral legs ,   CNS:  awake , alert non focal , ambulates, responsive ,   MSK: FROM in joints and no swelling       Allergies: Allergies    adhesives (Rash)  No Known Drug Allergies    Intolerances        Home Medications:  amLODIPine 5 mg oral tablet: 1 tab(s) orally once a day (07 Jun 2018 21:35)  aspirin 81 mg oral tablet: 1 tab(s) orally once a day (07 Jun 2018 18:32)  hydrALAZINE 25 mg oral tablet:  orally once a day (07 Jun 2018 18:32)  Lantus:  subcutaneous  (07 Jun 2018 18:32)  losartan 50 mg oral tablet: 1 tab(s) orally once a day (07 Jun 2018 18:32)  tamsulosin 0.4 mg oral capsule: 1 cap(s) orally once a day (07 Jun 2018 18:32)  verapamil 240 mg/12 hours oral tablet, extended release: 1 tab(s) orally once a day (in the morning) (07 Jun 2018 18:32)    Medications:   acetaminophen   Tablet. 650 milliGRAM(s) Oral every 6 hours PRN  amLODIPine   Tablet 5 milliGRAM(s) Oral daily  aspirin enteric coated 81 milliGRAM(s) Oral daily  atorvastatin 20 milliGRAM(s) Oral at bedtime  ceFAZolin   IVPB 1000 milliGRAM(s) IV Intermittent every 12 hours  dextrose 40% Gel 15 Gram(s) Oral once PRN  dextrose 5%. 1000 milliLiter(s) IV Continuous <Continuous>  dextrose 50% Injectable 12.5 Gram(s) IV Push once  dextrose 50% Injectable 25 Gram(s) IV Push once  dextrose 50% Injectable 25 Gram(s) IV Push once  enoxaparin Injectable 40 milliGRAM(s) SubCutaneous daily  furosemide   Injectable 40 milliGRAM(s) IV Push two times a day  glucagon  Injectable 1 milliGRAM(s) IntraMuscular once PRN  guaiFENesin   Syrup  (Sugar-Free) 100 milliGRAM(s) Oral every 6 hours PRN  hydrALAZINE 50 milliGRAM(s) Oral every 6 hours  insulin lispro (HumaLOG) corrective regimen sliding scale   SubCutaneous three times a day before meals  isosorbide   dinitrate Tablet (ISORDIL) 20 milliGRAM(s) Oral three times a day  lactobacillus acidophilus 1 Tablet(s) Oral two times a day with meals  magnesium oxide 400 milliGRAM(s) Oral two times a day with meals  metoprolol succinate ER 25 milliGRAM(s) Oral daily  multivitamin 1 Tablet(s) Oral daily  nystatin Powder 1 Application(s) Topical two times a day      LABS:    06-12    143  |  109<H>  |  59<H>  ----------------------------<  110<H>  4.5   |  24  |  2.80<H>    Ca    8.3<L>      12 Jun 2018 05:51        06-07 @ 19:59  INR 1.32        CAPILLARY BLOOD GLUCOSE      POCT Blood Glucose.: 118 mg/dL (12 Jun 2018 07:43)  POCT Blood Glucose.: 121 mg/dL (11 Jun 2018 22:00)  POCT Blood Glucose.: 120 mg/dL (11 Jun 2018 16:44)  POCT Blood Glucose.: 123 mg/dL (11 Jun 2018 11:31)          RECENT CULTURES:

## 2018-06-12 NOTE — DIETITIAN INITIAL EVALUATION ADULT. - NS AS NUTRI INTERV MEALS SNACK
consider 60gm protein consistent cho dash tlc low K diet/Protein - modified diet/Fat - modified diet/Mineral - modified diet/Carbohydrate - modified diet

## 2018-06-12 NOTE — PHYSICAL THERAPY INITIAL EVALUATION ADULT - DID THE PATIENT HAVE SURGERY?
n/a/Chest X-ray: (+) pulmonary vascular congestion /c small bilateral effusions; TTE echo doppler: (+) dyspnea; US renal: (+) slightly increased left renal cortical echogenicity. No hydronephrosis or renal stones. Chest X-Ray: (+) pulmonary vascular congestion /c small bilateral effusions; TTE echo doppler: (+) dyspnea; US renal: (+) slightly increased left renal cortical echogenicity. No hydronephrosis or renal stones./n/a

## 2018-06-12 NOTE — DIETITIAN INITIAL EVALUATION ADULT. - NS AS NUTRI INTERV ED CONTENT
Other (specify)/diet for acute renal failure left and reviewed with patient/Nutrition relationship to health/disease

## 2018-06-12 NOTE — PHYSICAL THERAPY INITIAL EVALUATION ADULT - RANGE OF MOTION EXAMINATION, REHAB EVAL
LLE: Left ankle has no motion due to s/p fused ankle./deficits as listed below/Right LE ROM was WFL (within functional limits)/bilateral upper extremity ROM was WFL (within functional limits)

## 2018-06-12 NOTE — PROGRESS NOTE ADULT - SUBJECTIVE AND OBJECTIVE BOX
[facility] Cardiology    CHIEF COMPLAINT: Patient is a 68y old  Male who presents with a chief complaint of Increased Swelling in bilateral lower extremities (08 Jun 2018 00:17)      Follow Up: [ ] Chest Pain      [ ] Dyspnea     [ ] Palpitations    [ ] Atrial Fibrillation     [ ] Ventricular Dysrhythmia    [ ] Abnormal EKG                      [ ] Abnormal Cardiac Enzymes     [ ] Valvular Disease    HPI:  69 yo m ,  ,plus kids , lives with wife , works from home , has hx of hypertension , osteoarthritis , and hyperlipidemia , and ckd stage 3 , and DM T2, and hx of foot surgery , and chronic edema and lymphedema in legs , and presents with a few weeks of increasing edema and poor dm control and shortness of breath and now with inability to void , and increased weakness,   denies cp , fever, chills , nausea a, vomiting , head aches , (07 Jun 2018 21:33)    No CP  significantly improved SOB and LE edema    PAST MEDICAL & SURGICAL HISTORY:  BPH (benign prostatic hyperplasia)  Hyperlipemia  Hypertension  No significant past surgical history      MEDICATIONS  (STANDING):  amLODIPine   Tablet 5 milliGRAM(s) Oral daily  aspirin enteric coated 81 milliGRAM(s) Oral daily  atorvastatin 20 milliGRAM(s) Oral at bedtime  ceFAZolin   IVPB 1000 milliGRAM(s) IV Intermittent every 12 hours  dextrose 5%. 1000 milliLiter(s) (50 mL/Hr) IV Continuous <Continuous>  dextrose 50% Injectable 12.5 Gram(s) IV Push once  dextrose 50% Injectable 25 Gram(s) IV Push once  dextrose 50% Injectable 25 Gram(s) IV Push once  enoxaparin Injectable 40 milliGRAM(s) SubCutaneous daily  furosemide   Injectable 40 milliGRAM(s) IV Push two times a day  hydrALAZINE 50 milliGRAM(s) Oral every 6 hours  insulin lispro (HumaLOG) corrective regimen sliding scale   SubCutaneous three times a day before meals  isosorbide   dinitrate Tablet (ISORDIL) 20 milliGRAM(s) Oral three times a day  lactobacillus acidophilus 1 Tablet(s) Oral two times a day with meals  magnesium oxide 400 milliGRAM(s) Oral two times a day with meals  metoprolol succinate ER 25 milliGRAM(s) Oral daily  multivitamin 1 Tablet(s) Oral daily  nystatin Powder 1 Application(s) Topical two times a day    MEDICATIONS  (PRN):  acetaminophen   Tablet. 650 milliGRAM(s) Oral every 6 hours PRN Mild Pain (1 - 3)  dextrose 40% Gel 15 Gram(s) Oral once PRN Blood Glucose LESS THAN 70 milliGRAM(s)/deciliter  glucagon  Injectable 1 milliGRAM(s) IntraMuscular once PRN Glucose LESS THAN 70 milligrams/deciliter  guaiFENesin   Syrup  (Sugar-Free) 100 milliGRAM(s) Oral every 6 hours PRN Cough      Allergies    adhesives (Rash)  No Known Drug Allergies    Intolerances        REVIEW OF SYSTEMS:    CONSTITUTIONAL: No weakness, fevers or chills.   EYES/ENT: No visual changes;  No vertigo or throat pain   NECK: No pain or stiffness  RESPIRATORY: No cough, wheezing, hemoptysis; No shortness of breath  CARDIOVASCULAR: No chest pain or palpitations  GASTROINTESTINAL: No abdominal or epigastric pain. No nausea, vomiting, or hematemesis; No diarrhea or constipation. No melena or hematochezia.  GENITOURINARY: No dysuria, frequency or hematuria  NEUROLOGICAL: No numbness or weakness  SKIN: No itching, burning, rashes, or lesions   All other review of systems is negative unless indicated above    Vital Signs Last 24 Hrs  T(C): 37.1 (12 Jun 2018 07:31), Max: 37.1 (12 Jun 2018 07:31)  T(F): 98.7 (12 Jun 2018 07:31), Max: 98.7 (12 Jun 2018 07:31)  HR: 82 (12 Jun 2018 07:31) (66 - 87)  BP: 142/62 (12 Jun 2018 07:31) (142/62 - 198/71)  BP(mean): --  RR: 19 (12 Jun 2018 07:31) (18 - 19)  SpO2: 91% (12 Jun 2018 07:31) (91% - 95%)    I&O's Summary    11 Jun 2018 07:01  -  12 Jun 2018 07:00  --------------------------------------------------------  IN: 300 mL / OUT: 0 mL / NET: 300 mL        PHYSICAL EXAM:  Constitutional: NAD, awake and alert, well-developed  Eyes:  EOMI,  Pupils round, No oral cyanosis.  HEENT: No exudate or erythema  Pulmonary: Non-labored, breath sounds are clear bilaterally, No wheezing, rales or rhonchi  Cardiovascular: Regular, S1 and S2, Soft sys murmur  Gastrointestinal: Obese Bowel Sounds present, soft, nontender.   Ext: 3+ pitting edema into knees  Neurological: Alert, no gross focal motor deficits  Skin: No rashes.  Psych:  Mood & affect appropriate  LABS: All Labs Reviewed:    06-12    143  |  109<H>  |  59<H>  ----------------------------<  110<H>  4.5   |  24  |  2.80<H>    Ca    8.3<L>      12 Jun 2018 05:51      · Assessment		  69 yo m ,  ,plus kids , lives with wife , works from home , has hx of hypertension , osteoarthritis , and hyperlipidemia , and ckd stage 3 , and DM T2, and hx of foot surgery with severe deformity limiting ambulation, and chronic edema and lymphedema in legs , and presents with a few weeks of increasing edema and poor dm control and shortness of breath and now with inability to void , and increased weakness consistent with Acute on Chronic Diastolic CHF and URI.      Acute on chronic severe Diastolic heart failure in the setting of severe CKD.  Also with hyperkalemia and 2:1 AV block and poorly controlled hypertension on admission, resolved after normalization of K+      ECHO 8/5/16; TDS, probably normal EF, severe LVH, LAE  Lexiscan: 8/2016: Abnormal  CARDIAC CATH: 8/16/16: D2 50%, otherwise normal coronaries    RADIOLOGY/EKG: Diagnosis Line Sinus rhythm with 1st degree AV block  Poor R Wave Progression     CXR: CHF    PLAN  1 Cont IV lasix at 40 bid as per discussion with Dr. Mon.   2. No Ace/ARb  3. Continue to observe on remote tele, NSR overnight   4. Continue amlodipine to 5mg  5.  Add Toprol 50mg daily and continue with Hydralazine 50mg 4 x day and Isordil 20 4 x day  7. ASA and statin for NON-OB CAD  8. DVT prophylaxis  9. Discussed with PMD this am about plan    Blood Culture:   06-09 @ 10:06  Pro Bnp [id number]    06-10 @ 08:41  TSH: 2.03

## 2018-06-13 ENCOUNTER — TRANSCRIPTION ENCOUNTER (OUTPATIENT)
Age: 68
End: 2018-06-13

## 2018-06-13 LAB
ANION GAP SERPL CALC-SCNC: 10 MMOL/L — SIGNIFICANT CHANGE UP (ref 5–17)
BUN SERPL-MCNC: 59 MG/DL — HIGH (ref 7–23)
CALCIUM SERPL-MCNC: 8.2 MG/DL — LOW (ref 8.5–10.1)
CHLORIDE SERPL-SCNC: 108 MMOL/L — SIGNIFICANT CHANGE UP (ref 96–108)
CO2 SERPL-SCNC: 25 MMOL/L — SIGNIFICANT CHANGE UP (ref 22–31)
CREAT SERPL-MCNC: 2.7 MG/DL — HIGH (ref 0.5–1.3)
GLUCOSE SERPL-MCNC: 104 MG/DL — HIGH (ref 70–99)
HCT VFR BLD CALC: 35.2 % — LOW (ref 39–50)
HGB BLD-MCNC: 11.2 G/DL — LOW (ref 13–17)
MAGNESIUM SERPL-MCNC: 1.8 MG/DL — SIGNIFICANT CHANGE UP (ref 1.6–2.6)
MCHC RBC-ENTMCNC: 28.6 PG — SIGNIFICANT CHANGE UP (ref 27–34)
MCHC RBC-ENTMCNC: 31.8 GM/DL — LOW (ref 32–36)
MCV RBC AUTO: 90 FL — SIGNIFICANT CHANGE UP (ref 80–100)
NRBC # BLD: 0 /100 WBCS — SIGNIFICANT CHANGE UP (ref 0–0)
PHOSPHATE SERPL-MCNC: 3 MG/DL — SIGNIFICANT CHANGE UP (ref 2.5–4.5)
PLATELET # BLD AUTO: 203 K/UL — SIGNIFICANT CHANGE UP (ref 150–400)
POTASSIUM SERPL-MCNC: 4.3 MMOL/L — SIGNIFICANT CHANGE UP (ref 3.5–5.3)
POTASSIUM SERPL-SCNC: 4.3 MMOL/L — SIGNIFICANT CHANGE UP (ref 3.5–5.3)
RBC # BLD: 3.91 M/UL — LOW (ref 4.2–5.8)
RBC # FLD: 15 % — HIGH (ref 10.3–14.5)
SODIUM SERPL-SCNC: 143 MMOL/L — SIGNIFICANT CHANGE UP (ref 135–145)
SSDNA AB SER-ACNC: 111 U/ML — HIGH
URATE SERPL-MCNC: 9.3 MG/DL — HIGH (ref 3.4–8.8)
WBC # BLD: 5.97 K/UL — SIGNIFICANT CHANGE UP (ref 3.8–10.5)
WBC # FLD AUTO: 5.97 K/UL — SIGNIFICANT CHANGE UP (ref 3.8–10.5)

## 2018-06-13 RX ORDER — HYDRALAZINE HCL 50 MG
1 TABLET ORAL
Qty: 0 | Refills: 0 | COMMUNITY
Start: 2018-06-13

## 2018-06-13 RX ORDER — AMLODIPINE BESYLATE 2.5 MG/1
10 TABLET ORAL DAILY
Qty: 0 | Refills: 0 | Status: DISCONTINUED | OUTPATIENT
Start: 2018-06-13 | End: 2018-06-15

## 2018-06-13 RX ORDER — ATORVASTATIN CALCIUM 80 MG/1
1 TABLET, FILM COATED ORAL
Qty: 0 | Refills: 0 | COMMUNITY
Start: 2018-06-13

## 2018-06-13 RX ORDER — MAGNESIUM OXIDE 400 MG ORAL TABLET 241.3 MG
1 TABLET ORAL
Qty: 0 | Refills: 0 | COMMUNITY
Start: 2018-06-13

## 2018-06-13 RX ORDER — ACETAMINOPHEN 500 MG
2 TABLET ORAL
Qty: 0 | Refills: 0 | COMMUNITY
Start: 2018-06-13

## 2018-06-13 RX ORDER — CARVEDILOL PHOSPHATE 80 MG/1
6.25 CAPSULE, EXTENDED RELEASE ORAL EVERY 12 HOURS
Qty: 0 | Refills: 0 | Status: DISCONTINUED | OUTPATIENT
Start: 2018-06-13 | End: 2018-06-15

## 2018-06-13 RX ORDER — NYSTATIN CREAM 100000 [USP'U]/G
1 CREAM TOPICAL
Qty: 0 | Refills: 0 | COMMUNITY
Start: 2018-06-13

## 2018-06-13 RX ADMIN — ENOXAPARIN SODIUM 40 MILLIGRAM(S): 100 INJECTION SUBCUTANEOUS at 12:04

## 2018-06-13 RX ADMIN — Medication 50 MILLIGRAM(S): at 17:18

## 2018-06-13 RX ADMIN — Medication 1 TABLET(S): at 12:06

## 2018-06-13 RX ADMIN — Medication 100 MILLIGRAM(S): at 21:14

## 2018-06-13 RX ADMIN — ERGOCALCIFEROL 50000 UNIT(S): 1.25 CAPSULE ORAL at 12:04

## 2018-06-13 RX ADMIN — Medication 40 MILLIGRAM(S): at 17:18

## 2018-06-13 RX ADMIN — Medication 50 MILLIGRAM(S): at 12:04

## 2018-06-13 RX ADMIN — Medication 1 TABLET(S): at 07:59

## 2018-06-13 RX ADMIN — ISOSORBIDE DINITRATE 20 MILLIGRAM(S): 5 TABLET ORAL at 05:26

## 2018-06-13 RX ADMIN — ISOSORBIDE DINITRATE 20 MILLIGRAM(S): 5 TABLET ORAL at 13:07

## 2018-06-13 RX ADMIN — ISOSORBIDE DINITRATE 20 MILLIGRAM(S): 5 TABLET ORAL at 21:05

## 2018-06-13 RX ADMIN — Medication 25 MILLIGRAM(S): at 05:26

## 2018-06-13 RX ADMIN — AMLODIPINE BESYLATE 10 MILLIGRAM(S): 2.5 TABLET ORAL at 12:04

## 2018-06-13 RX ADMIN — Medication 81 MILLIGRAM(S): at 12:06

## 2018-06-13 RX ADMIN — AMLODIPINE BESYLATE 5 MILLIGRAM(S): 2.5 TABLET ORAL at 05:26

## 2018-06-13 RX ADMIN — NYSTATIN CREAM 1 APPLICATION(S): 100000 CREAM TOPICAL at 17:35

## 2018-06-13 RX ADMIN — ATORVASTATIN CALCIUM 20 MILLIGRAM(S): 80 TABLET, FILM COATED ORAL at 21:05

## 2018-06-13 RX ADMIN — Medication 40 MILLIGRAM(S): at 05:28

## 2018-06-13 RX ADMIN — Medication 100 MILLIGRAM(S): at 17:23

## 2018-06-13 RX ADMIN — MAGNESIUM OXIDE 400 MG ORAL TABLET 400 MILLIGRAM(S): 241.3 TABLET ORAL at 07:59

## 2018-06-13 RX ADMIN — MAGNESIUM OXIDE 400 MG ORAL TABLET 400 MILLIGRAM(S): 241.3 TABLET ORAL at 17:18

## 2018-06-13 RX ADMIN — Medication 1 TABLET(S): at 17:18

## 2018-06-13 RX ADMIN — Medication 50 MILLIGRAM(S): at 05:26

## 2018-06-13 RX ADMIN — NYSTATIN CREAM 1 APPLICATION(S): 100000 CREAM TOPICAL at 05:28

## 2018-06-13 RX ADMIN — CARVEDILOL PHOSPHATE 6.25 MILLIGRAM(S): 80 CAPSULE, EXTENDED RELEASE ORAL at 17:18

## 2018-06-13 RX ADMIN — Medication 100 MILLIGRAM(S): at 05:26

## 2018-06-13 NOTE — DISCHARGE NOTE ADULT - MEDICATION SUMMARY - MEDICATIONS TO TAKE
I will START or STAY ON the medications listed below when I get home from the hospital:    aspirin 81 mg oral tablet  -- 1 tab(s) by mouth once a day  -- Indication: For cad    acetaminophen 325 mg oral tablet  -- 2 tab(s) by mouth every 6 hours, As needed, Mild Pain (1 - 3)  -- Indication: For pain    cloNIDine 0.1 mg/24 hr transdermal film, extended release  -- 1 patch by transdermal patch once a week   -- Indication: For Htn    tamsulosin 0.4 mg oral capsule  -- 1 cap(s) by mouth once a day  -- Indication: For BPH (benign prostatic hyperplasia)    isosorbide mononitrate 60 mg oral tablet, extended release  -- 1 tab(s) by mouth once a day  -- Indication: For Htn    Lantus  --  subcutaneous   -- as per sliding scale  -- Indication: For dm    atorvastatin 20 mg oral tablet  -- 1 tab(s) by mouth once a day (at bedtime)  -- Indication: For lipids    carvedilol 6.25 mg oral tablet  -- 1 tab(s) by mouth every 12 hours  -- Indication: For Htn    amLODIPine 5 mg oral tablet  -- 1 tab(s) by mouth once a day  -- Indication: For Htn    nystatin 100,000 units/g topical powder  -- 1 application on skin 2 times a day  -- Indication: For Rash    torsemide 20 mg oral tablet  -- 1 tab(s) by mouth 2 times a day  -- Indication: For edema    potassium chloride 10 mEq oral tablet, extended release  -- 1 tab(s) by mouth once a day  -- Indication: For edema    magnesium oxide 400 mg (241.3 mg elemental magnesium) oral tablet  -- 1 tab(s) by mouth 2 times a day (with meals)  -- Indication: For vitamin    lactobacillus acidophilus oral capsule  -- 1 tab(s) by mouth 2 times a day   -- Indication: For probiotics    hydrALAZINE 50 mg oral tablet  -- 1 tab(s) by mouth every 6 hours  -- Indication: For Htn    multivitamin  -- Indication: For vitamin    ergocalciferol 50,000 intl units (1.25 mg) oral capsule  -- 1 cap(s) by mouth once a week  -- Indication: For vitamin I will START or STAY ON the medications listed below when I get home from the hospital:    aspirin 81 mg oral tablet  -- 1 tab(s) by mouth once a day  -- Indication: For cad    acetaminophen 325 mg oral tablet  -- 2 tab(s) by mouth every 6 hours, As needed, Mild Pain (1 - 3)  -- Indication: For pain    cloNIDine 0.1 mg/24 hr transdermal film, extended release  -- 1 patch by transdermal patch once a week   -- Indication: For Htn    tamsulosin 0.4 mg oral capsule  -- 1 cap(s) by mouth once a day  -- Indication: For BPH (benign prostatic hyperplasia)    isosorbide mononitrate 60 mg oral tablet, extended release  -- 1 tab(s) by mouth once a day  -- Indication: For Htn    Lantus  --  subcutaneous   -- as per sliding scale  -- Indication: For dm    atorvastatin 20 mg oral tablet  -- 1 tab(s) by mouth once a day (at bedtime)  -- Indication: For lipids    carvedilol 6.25 mg oral tablet  -- 1 tab(s) by mouth every 12 hours  -- Indication: For Htn    amLODIPine 5 mg oral tablet  -- 1 tab(s) by mouth once a day  -- Indication: For Htn    nystatin 100,000 units/g topical powder  -- 1 application on skin 2 times a day  -- Indication: For Rash    torsemide 20 mg oral tablet  -- 1 tab(s) by mouth 2 times a day  -- Indication: For edema    potassium chloride 10 mEq oral tablet, extended release  -- 1 tab(s) by mouth once a day  -- Indication: For edema    magnesium oxide 400 mg (241.3 mg elemental magnesium) oral tablet  -- 1 tab(s) by mouth 2 times a day (with meals)  -- Indication: For vitamin    lactobacillus acidophilus oral capsule  -- 1 tab(s) by mouth 2 times a day   -- Indication: For probiotics    hydrALAZINE 50 mg oral tablet  -- 1.5 tab(s) by mouth 4 times a day   -- It is very important that you take or use this exactly as directed.  Do not skip doses or discontinue unless directed by your doctor.  Some non-prescription drugs may aggravate your condition.  Read all labels carefully.  If a warning appears, check with your doctor before taking.    -- Indication: For Htn    multivitamin  -- Indication: For vitamin    ergocalciferol 50,000 intl units (1.25 mg) oral capsule  -- 1 cap(s) by mouth once a week  -- Indication: For vitamin

## 2018-06-13 NOTE — PROGRESS NOTE ADULT - SUBJECTIVE AND OBJECTIVE BOX
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  No CP or SOB.  Ambulated short distance yesterday w/o SOB.    HPI:  67 yo m ,  ,plus kids , lives with wife , works from home , has hx of hypertension , osteoarthritis , and hyperlipidemia , and ckd stage 3 , and DM T2, and hx of foot surgery , and chronic edema and lymphedema in legs , and presents with a few weeks of increasing edema and poor dm control and shortness of breath and now with inability to void , and increased weakness,   denies cp , fever, chills , nausea a, vomiting , head aches , (07 Jun 2018 21:33)      REVIEW OF SYSTEMS:    CONSTITUTIONAL: No weakness, fevers or chills.   EYES/ENT: No visual changes;  No vertigo or throat pain   NECK: No pain or stiffness  RESPIRATORY: No cough, wheezing, hemoptysis; No shortness of breath  CARDIOVASCULAR: No chest pain or palpitations  GASTROINTESTINAL: No abdominal or epigastric pain. No nausea, vomiting, or hematemesis; No diarrhea or constipation. No melena or hematochezia.  GENITOURINARY: No dysuria, frequency or hematuria  NEUROLOGICAL: No numbness or weakness  SKIN: No itching, burning, rashes, or lesions   All other review of systems is negative unless indicated above      MEDICATIONS  (STANDING):  amLODIPine   Tablet 5 milliGRAM(s) Oral daily  aspirin enteric coated 81 milliGRAM(s) Oral daily  atorvastatin 20 milliGRAM(s) Oral at bedtime  ceFAZolin   IVPB 1000 milliGRAM(s) IV Intermittent every 12 hours  dextrose 5%. 1000 milliLiter(s) (50 mL/Hr) IV Continuous <Continuous>  dextrose 50% Injectable 12.5 Gram(s) IV Push once  dextrose 50% Injectable 25 Gram(s) IV Push once  dextrose 50% Injectable 25 Gram(s) IV Push once  enoxaparin Injectable 40 milliGRAM(s) SubCutaneous daily  ergocalciferol 43514 Unit(s) Oral every week  furosemide   Injectable 40 milliGRAM(s) IV Push two times a day  hydrALAZINE 50 milliGRAM(s) Oral every 6 hours  insulin lispro (HumaLOG) corrective regimen sliding scale   SubCutaneous three times a day before meals  isosorbide   dinitrate Tablet (ISORDIL) 20 milliGRAM(s) Oral three times a day  lactobacillus acidophilus 1 Tablet(s) Oral two times a day with meals  magnesium oxide 400 milliGRAM(s) Oral two times a day with meals  metoprolol succinate ER 25 milliGRAM(s) Oral daily  Nephro-jessica 1 Tablet(s) Oral daily  nystatin Powder 1 Application(s) Topical two times a day    MEDICATIONS  (PRN):  acetaminophen   Tablet. 650 milliGRAM(s) Oral every 6 hours PRN Mild Pain (1 - 3)  dextrose 40% Gel 15 Gram(s) Oral once PRN Blood Glucose LESS THAN 70 milliGRAM(s)/deciliter  glucagon  Injectable 1 milliGRAM(s) IntraMuscular once PRN Glucose LESS THAN 70 milligrams/deciliter  guaiFENesin   Syrup  (Sugar-Free) 100 milliGRAM(s) Oral every 6 hours PRN Cough      ALLERGIES: adhesives (Rash)  No Known Drug Allergies      FAMILY HISTORY:      PHYSICAL EXAMINATION:  -----------------------------  T(C): 36.6 (06-13-18 @ 07:40), Max: 37.2 (06-12-18 @ 20:30)  HR: 52 (06-13-18 @ 07:40) (52 - 87)  BP: 170/60 (06-13-18 @ 09:00) (142/80 - 190/79)  RR: 18 (06-13-18 @ 07:40) (17 - 19)  SpO2: 95% (06-13-18 @ 07:40) (90% - 98%)  Wt(kg): --    06-12 @ 07:01  -  06-13 @ 07:00  --------------------------------------------------------  IN:    Oral Fluid: 660 mL    Solution: 100 mL  Total IN: 760 mL    OUT:  Total OUT: 0 mL    Total NET: 760 mL        PHYSICAL EXAM:  Constitutional: NAD, awake and alert, well-developed  Eyes:  EOMI,  Pupils round, No oral cyanosis.  HEENT: No exudate or erythema  Pulmonary: Non-labored, breath sounds are clear bilaterally, No wheezing, rales or rhonchi  Cardiovascular: Regular, S1 and S2, Soft sys murmur  Gastrointestinal: Obese Bowel Sounds present, soft, nontender.   Ext: 3+ pitting edema into knees  Neurological: Alert, no gross focal motor deficits  Skin: No rashes.  Psych:  Mood & affect appropriate        LABS:   --------  06-13    143  |  108  |  59<H>  ----------------------------<  104<H>  4.3   |  25  |  2.70<H>    Ca    8.2<L>      13 Jun 2018 06:09  Phos  3.0     06-13  Mg     1.8     06-13                           11.2   5.97  )-----------( 203      ( 13 Jun 2018 06:09 )             35.2           125 mg/dL, 84 mg/dL, 22 mg/dL<L>, 96 mg/dL

## 2018-06-13 NOTE — PROGRESS NOTE ADULT - SUBJECTIVE AND OBJECTIVE BOX
Patient is a 68y Male whom presented to the hospital with ckd and venkata     pt seen and examined , no complains , no cp , no sob       PAST MEDICAL & SURGICAL HISTORY:  BPH (benign prostatic hyperplasia)  Hyperlipemia  Hypertension  No significant past surgical history      REVIEW OF SYSTEMS:    CONSTITUTIONAL: No weakness, fevers or chills  NECK: No pain or stiffness  RESPIRATORY: No cough, wheezing, hemoptysis; No shortness of breath  CARDIOVASCULAR: No chest pain or palpitations  GASTROINTESTINAL: No abdominal or epigastric pain. No nausea, vomiting,     No diarrhea or constipation. No melena                                         MEDICATIONS  (STANDING):  amLODIPine   Tablet 10 milliGRAM(s) Oral daily  aspirin enteric coated 81 milliGRAM(s) Oral daily  atorvastatin 20 milliGRAM(s) Oral at bedtime  carvedilol 6.25 milliGRAM(s) Oral every 12 hours  ceFAZolin   IVPB 1000 milliGRAM(s) IV Intermittent every 12 hours  dextrose 5%. 1000 milliLiter(s) (50 mL/Hr) IV Continuous <Continuous>  dextrose 50% Injectable 12.5 Gram(s) IV Push once  dextrose 50% Injectable 25 Gram(s) IV Push once  dextrose 50% Injectable 25 Gram(s) IV Push once  enoxaparin Injectable 40 milliGRAM(s) SubCutaneous daily  ergocalciferol 05570 Unit(s) Oral every week  furosemide   Injectable 40 milliGRAM(s) IV Push two times a day  hydrALAZINE 50 milliGRAM(s) Oral every 6 hours  insulin lispro (HumaLOG) corrective regimen sliding scale   SubCutaneous three times a day before meals  isosorbide   dinitrate Tablet (ISORDIL) 20 milliGRAM(s) Oral three times a day  lactobacillus acidophilus 1 Tablet(s) Oral two times a day with meals  magnesium oxide 400 milliGRAM(s) Oral two times a day with meals  Nephro-jessica 1 Tablet(s) Oral daily  nystatin Powder 1 Application(s) Topical two times a day    MEDICATIONS  (PRN):  acetaminophen   Tablet. 650 milliGRAM(s) Oral every 6 hours PRN Mild Pain (1 - 3)  dextrose 40% Gel 15 Gram(s) Oral once PRN Blood Glucose LESS THAN 70 milliGRAM(s)/deciliter  glucagon  Injectable 1 milliGRAM(s) IntraMuscular once PRN Glucose LESS THAN 70 milligrams/deciliter  guaiFENesin   Syrup  (Sugar-Free) 100 milliGRAM(s) Oral every 6 hours PRN Cough                                  11.2   5.97  )-----------( 203      ( 13 Jun 2018 06:09 )             35.2       CBC Full  -  ( 13 Jun 2018 06:09 )  WBC Count : 5.97 K/uL  Hemoglobin : 11.2 g/dL  Hematocrit : 35.2 %  Platelet Count - Automated : 203 K/uL  Mean Cell Volume : 90.0 fl  Mean Cell Hemoglobin : 28.6 pg  Mean Cell Hemoglobin Concentration : 31.8 gm/dL  Auto Neutrophil # : x  Auto Lymphocyte # : x  Auto Monocyte # : x  Auto Eosinophil # : x  Auto Basophil # : x  Auto Neutrophil % : x  Auto Lymphocyte % : x  Auto Monocyte % : x  Auto Eosinophil % : x  Auto Basophil % : x      06-13    143  |  108  |  59<H>  ----------------------------<  104<H>  4.3   |  25  |  2.70<H>    Ca    8.2<L>      13 Jun 2018 06:09  Phos  3.0     06-13  Mg     1.8     06-13        CAPILLARY BLOOD GLUCOSE      POCT Blood Glucose.: 134 mg/dL (13 Jun 2018 11:09)  POCT Blood Glucose.: 103 mg/dL (13 Jun 2018 07:41)  POCT Blood Glucose.: 127 mg/dL (12 Jun 2018 22:30)  POCT Blood Glucose.: 116 mg/dL (12 Jun 2018 16:24)      Vital Signs Last 24 Hrs  T(C): 36.6 (13 Jun 2018 07:40), Max: 37.2 (12 Jun 2018 20:30)  T(F): 97.9 (13 Jun 2018 07:40), Max: 99 (12 Jun 2018 20:30)  HR: 52 (13 Jun 2018 07:40) (52 - 87)  BP: 170/60 (13 Jun 2018 09:00) (142/80 - 190/79)  BP(mean): --  RR: 18 (13 Jun 2018 07:40) (17 - 19)  SpO2: 95% (13 Jun 2018 07:40) (90% - 98%)    Urinalysis Basic - ( 12 Jun 2018 22:45 )    Color: x / Appearance: x / SG: x / pH: x  Gluc: x / Ketone: x  / Bili: x / Urobili: x   Blood: x / Protein: x / Nitrite: x   Leuk Esterase: x / RBC: 3-5 /HPF / WBC Negative   Sq Epi: x / Non Sq Epi: Few / Bacteria: Occasional                                   PHYSICAL EXAM:    Constitutional: NAD  HEENT: conjunctive   clear   Neck:  No JVD  Respiratory: CTAB  Cardiovascular: S1 and S2  Gastrointestinal: BS+, soft, NT/ND  Extremities: pos 3 plus  peripheral edema  Neurological: A/O x 3, no focal deficits  Psychiatric: Normal mood, normal affect  : No Sidhu  Skin: No rashes  Access: Not applicable

## 2018-06-13 NOTE — PROGRESS NOTE ADULT - SUBJECTIVE AND OBJECTIVE BOX
PCP  Subjective:   in bed , awake , alert responsive     Objective:   Vital Signs Last 24 Hrs  T(C): 36.9 (18 @ 15:42), Max: 37.2 (18 @ 20:30)  T(F): 98.4 (18 @ 15:42), Max: 99 (18 @ 20:30)  HR: 62 (18 @ 15:42) (50 - 87)  BP: 170/78 (18 @ 11:58) (155/84 - 190/79)  BP(mean): --  RR: 18 (18 @ 15:42) (17 - 19)  SpO2: 96% (18 @ 15:42) (90% - 96%)  Daily     Daily Weight in k.9 (2018 04:16)      GENERAL:  wdwn obese male , awake and alert , responsive ,   EYES: eomi jose  NECK: supple no masses  CHEST/LUNG: clear , some poor air movement in bases  HEART: s1 s2 regular ,   ABDOMEN:  obese , large panus , and rash under belly non tender , lymphedema in abdominal wall   EXTREMITIES:  edema pitting bilateral ,   SKIN:  some hyperpigmentation bilateral legs ,   CNS:  awake , alert non focal , ambulates, responsive ,   MSK: FROM in joints and no swelling         Allergies: Allergies    adhesives (Rash)  No Known Drug Allergies    Intolerances        Home Medications:  amLODIPine 5 mg oral tablet: 1 tab(s) orally once a day (2018 21:35)  aspirin 81 mg oral tablet: 1 tab(s) orally once a day (2018 18:32)  hydrALAZINE 25 mg oral tablet:  orally once a day (2018 18:32)  Lantus:  subcutaneous  (2018 18:32)  losartan 50 mg oral tablet: 1 tab(s) orally once a day (2018 18:32)  tamsulosin 0.4 mg oral capsule: 1 cap(s) orally once a day (2018 18:32)  verapamil 240 mg/12 hours oral tablet, extended release: 1 tab(s) orally once a day (in the morning) (2018 18:32)    Medications:   acetaminophen   Tablet. 650 milliGRAM(s) Oral every 6 hours PRN  amLODIPine   Tablet 10 milliGRAM(s) Oral daily  aspirin enteric coated 81 milliGRAM(s) Oral daily  atorvastatin 20 milliGRAM(s) Oral at bedtime  carvedilol 6.25 milliGRAM(s) Oral every 12 hours  ceFAZolin   IVPB 1000 milliGRAM(s) IV Intermittent every 12 hours  dextrose 40% Gel 15 Gram(s) Oral once PRN  dextrose 5%. 1000 milliLiter(s) IV Continuous <Continuous>  dextrose 50% Injectable 12.5 Gram(s) IV Push once  dextrose 50% Injectable 25 Gram(s) IV Push once  dextrose 50% Injectable 25 Gram(s) IV Push once  enoxaparin Injectable 40 milliGRAM(s) SubCutaneous daily  ergocalciferol 55268 Unit(s) Oral every week  furosemide   Injectable 40 milliGRAM(s) IV Push two times a day  glucagon  Injectable 1 milliGRAM(s) IntraMuscular once PRN  guaiFENesin   Syrup  (Sugar-Free) 100 milliGRAM(s) Oral every 6 hours PRN  hydrALAZINE 50 milliGRAM(s) Oral every 6 hours  insulin lispro (HumaLOG) corrective regimen sliding scale   SubCutaneous three times a day before meals  isosorbide   dinitrate Tablet (ISORDIL) 20 milliGRAM(s) Oral three times a day  lactobacillus acidophilus 1 Tablet(s) Oral two times a day with meals  magnesium oxide 400 milliGRAM(s) Oral two times a day with meals  Nephro-jessica 1 Tablet(s) Oral daily  nystatin Powder 1 Application(s) Topical two times a day      LABS:                        11.2   5.97  )-----------( 203      ( 2018 06:09 )             35.2     06-13    143  |  108  |  59<H>  ----------------------------<  104<H>  4.3   |  25  |  2.70<H>    Ca    8.2<L>      2018 06:09  Phos  3.0     06-13  Mg     1.8     06-13          Urinalysis Basic - ( 2018 22:45 )    Color: x / Appearance: x / SG: x / pH: x  Gluc: x / Ketone: x  / Bili: x / Urobili: x   Blood: x / Protein: x / Nitrite: x   Leuk Esterase: x / RBC: 3-5 /HPF / WBC Negative   Sq Epi: x / Non Sq Epi: Few / Bacteria: Occasional        CAPILLARY BLOOD GLUCOSE      POCT Blood Glucose.: 134 mg/dL (2018 11:09)  POCT Blood Glucose.: 103 mg/dL (2018 07:41)  POCT Blood Glucose.: 127 mg/dL (2018 22:30)  POCT Blood Glucose.: 116 mg/dL (2018 16:24)          RECENT CULTURES:

## 2018-06-13 NOTE — DISCHARGE NOTE ADULT - MEDICATION SUMMARY - MEDICATIONS TO CHANGE
I will SWITCH the dose or number of times a day I take the medications listed below when I get home from the hospital:    hydrALAZINE 25 mg oral tablet  --  by mouth once a day

## 2018-06-13 NOTE — DISCHARGE NOTE ADULT - HOSPITAL COURSE
· Assessment	  69 yo m ,  ,plus kids , lives with wife , works from home , has hx of hypertension , osteoarthritis , and hyperlipidemia , and ckd stage 3 , and DM T2, and hx of foot surgery , and chronic edema and lymphedema in legs , and presents with a few weeks of increasing edema and poor dm control and shortness of breath and now with inability to void , and increased weakness,   denies cp , fever, chills , nausea a, vomiting , head aches ,   patient admitted for CINDY on CKD , and hyperkalemia , and diabetes mellitus type 2 , and edema and fluid retention , on lasix , nephro to follow ,   on IV abx for ? uti ,   low sugar this am , got glucose , , will adjust insulin    continue aggressive diuresis , and follow labs , and weight      Problem/Plan - 1:  ·  Problem: Acute renal failure, unspecified acute renal failure type.  Plan: iv fluids , nephro consult.      Problem/Plan - 2:  ·  Problem: Hyperkalemia.      Problem/Plan - 3:  ·  Problem: Hypervolemia, unspecified hypervolemia type.      Problem/Plan - 4:  ·  Problem: Urinary tract infection without hematuria, site unspecified.      Problem/Plan - 5:  ·  Problem: BPH (benign prostatic hyperplasia).      Problem/Plan - 6:  Problem: Mixed hyperlipidemia.     Problem/Plan - 7:  ·  Problem: Renovascular hypertension.  Plan: bp meds.      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  Plan: fs coverage.      Problem/Plan - 9:  ·  Problem: Neuropathy due to type 2 diabetes mellitus.     · Assessment		  69 yo m ,  ,plus kids , lives with wife , works from home , has hx of hypertension , osteoarthritis , and hyperlipidemia , and ckd stage 3 , and DM T2, and hx of foot surgery , and chronic edema and lymphedema in legs , and presents with a few weeks of increasing edema and poor dm control and shortness of breath and now with inability to void , and increased weakness, now with cindy and ckd      Problem/Plan - 1:  ·  Problem: Acute renal failure, unspecified acute renal failure type.  Plan: Acute renal failure, unspecified acute renal failure type.    sig proteinurea may due to dm , htn, obesity   posiible atn vs ain   fluid overload continue with lasix 40 mg iv q 12 , gfr is improved with diuresis   Serum creatinine is stable at 2.7   , approximating GFR is decreased   ml/min.   There is  progression. No uremic symptoms   No evidence of anemia.  Fluid status stable.  Will continue to avoid nephrotoxic drugs.  Patient remains asymptomatic.   Continue current therapy.  hold  inhibitor. hold   ARB.      Problem/Plan - 2:  ·  Problem: Hyperkalemia.  Plan: is controlled ,      Problem/Plan - 3:  ·  Problem: Hypertension.  Plan: P monitoring,continue current antihypertensive meds, low salt diet,followup with PMD in 1-2 weeks.        Problem/Plan - 1:  ·  Problem: Acute renal failure, unspecified acute renal failure type.  Plan: follow  labs.        Problem/Plan - 5:  ·  Problem: BPH (benign prostatic hyperplasia).  Plan: flomax.      Problem/Plan - 6:  Problem: Mixed hyperlipidemia. Plan: atorvastatin.     Problem/Plan - 7:  ·  Problem: Renovascular hypertension.  Plan: bp meds.      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  Plan: fs coverage.      Problem/Plan - 9:  ·  Problem: Neuropathy due to type 2 diabetes mellitus.  Plan: f/u labs.

## 2018-06-13 NOTE — PROGRESS NOTE ADULT - ASSESSMENT
69 yo m ,  ,plus kids , lives with wife , works from home , has hx of hypertension , osteoarthritis , and hyperlipidemia , and ckd stage 3 , and DM T2, and hx of foot surgery , and chronic edema and lymphedema in legs , and presents with a few weeks of increasing edema and poor dm control and shortness of breath and now with inability to void , and increased weakness, now with venkata and ckd

## 2018-06-13 NOTE — DISCHARGE NOTE ADULT - PATIENT PORTAL LINK FT
You can access the [employer] Patient Portal, offered by [facility], by registering with the following website: [email or web address]

## 2018-06-13 NOTE — DISCHARGE NOTE ADULT - CARE PLAN
Principal Discharge DX:	Acute renal failure, unspecified acute renal failure type  Goal:	comply with diet and diuretic meds  Assessment and plan of treatment:	follow up with renal and vardiology and pcp  Secondary Diagnosis:	Benign prostatic hyperplasia with lower urinary tract symptoms, symptom details unspecified  Goal:	flomax  Secondary Diagnosis:	Hyperkalemia  Goal:	monitor  Secondary Diagnosis:	Mixed hyperlipidemia  Goal:	atorvasatin  Secondary Diagnosis:	Renovascular hypertension  Goal:	bp meds norvasc , lasix , and hydralazine , and coreg  Secondary Diagnosis:	Hypervolemia, unspecified hypervolemia type  Goal:	follow up primary care physician for labs  Secondary Diagnosis:	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 Principal Discharge DX:	Acute renal failure, unspecified acute renal failure type  Goal:	comply with diet and diuretic meds  Assessment and plan of treatment:	follow up with renal and cardiology and pcp  Secondary Diagnosis:	Benign prostatic hyperplasia with lower urinary tract symptoms, symptom details unspecified  Goal:	flomax  Secondary Diagnosis:	Hyperkalemia  Goal:	monitor  Secondary Diagnosis:	Mixed hyperlipidemia  Goal:	atorvasatin  Secondary Diagnosis:	Renovascular hypertension  Goal:	bp meds norvasc , lasix , and hydralazine , and coreg and clinidine  Secondary Diagnosis:	Hypervolemia, unspecified hypervolemia type  Goal:	follow up primary care physician for labs  Secondary Diagnosis:	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

## 2018-06-13 NOTE — DISCHARGE NOTE ADULT - NS AS ACTIVITY OBS
No Heavy lifting/straining/Do not make important decisions don't drive for few days/No Heavy lifting/straining/Do not make important decisions/Do not drive or operate machinery

## 2018-06-13 NOTE — DISCHARGE NOTE ADULT - OTHER SIGNIFICANT FINDINGS
< from: TTE Echo Doppler w/o Cont (06.08.18 @ 13:56) >     EXAM:  ECHO TTE W/O CON COMP W/DOPPLR         PROCEDURE DATE:  06/08/2018        INTERPRETATION:  INDICATION: Dyspnea    Conclusion:   1. This is a very technically limited study.  2. There is normal left ventricular size and probable left ventricular   systolic function with an ejection fraction greater than 55%.  3. There is diastolic dysfunction.  4. There is mild left ventricular hypertrophy.  5. Given limited endocardial definition, wall motion abnormalities could   not be excluded on this study.  6. The aortic valve appears mildly calcified with mild aortic valve   stenosis with a mean aortic valve gradient of 16 mmHg and an aortic valve   area of 1.3.  7. There is mild tricuspid regurgitation. The right ventricular systolic   pressure could not be calculated given a low velocity.  8. There is moderate left atrial enlargement.  9. There is a small left-sided pleural effusion.      Blood Pressure 175/69 mmHg         BSA 2.72 sq m    Dimensions:    LA 5.5 cm       Normal Values: 2.0 - 4.0 cm    Ao 3.5 cm        Normal Values: 2.0 - 3.8 cm  IVSd 1.3 cm       Normal Values: 0.6 - 1.2 cm  PWd 1.3 cm       Normal Values: 0.6 - 1.1 cm  LVIDd 5.1 cm         Normal Values: 3.0 - 5.6 cm  LVIDs 2.4 cm         Normal Values: 1.8 - 4.0 cm                    VIC WAY M.D., ATTENDING CARDIOLOGIST  This document has been electronically signed. Jun 9 2018  7:13AM        < from: US Renal (06.08.18 @ 14:27) >  MPARISON: None available.    TECHNIQUE: Sonography of the kidneys and bladder.     FINDINGS:    Study is limited by limited patient mobility and patient body habitus.    Right kidney:  12.0 cm. No hydronephrosis or gross calculi.    Left kidney:  9.6 cm. There is increased renal cortical echogenicity. No   hydronephrosis or gross calculi.    Urinary bladder: Poorly visualized.    Small ascites is seen in the left upper quadrant and pelvis.    IMPRESSION:     Limited exam.    Slightly increased left renal cortical echogenicity. No hydronephrosis or   renal stone.    Small left upper quadrant and pelvic ascites.                GAIL FORREST M.D., ATTENDING RADIOLOGIST  This document has been electronically signed. Jun 8 2018  2:46PM              < end of copied text >

## 2018-06-13 NOTE — DISCHARGE NOTE ADULT - MEDICATION SUMMARY - MEDICATIONS TO STOP TAKING
I will STOP taking the medications listed below when I get home from the hospital:    losartan 50 mg oral tablet  -- 1 tab(s) by mouth once a day    verapamil 240 mg/12 hours oral tablet, extended release  -- 1 tab(s) by mouth once a day (in the morning)

## 2018-06-13 NOTE — DISCHARGE NOTE ADULT - CARE PROVIDER_API CALL
Kervin Stern), Internal Medicine  [address] [place] [address]  Phone: [phone]  Fax: [phone]

## 2018-06-13 NOTE — DISCHARGE NOTE ADULT - PLAN OF CARE
comply with diet and diuretic meds follow up with renal and vardiology and pcp flomax monitor atorvasatin bp meds norvasc , lasix , and hydralazine , and coreg follow up primary care physician for labs lantus and fs monitor follow up with renal and cardiology and pcp bp meds norvasc , lasix , and hydralazine , and coreg and clinidine

## 2018-06-13 NOTE — PROGRESS NOTE ADULT - ASSESSMENT
· Assessment		  69 yo m ,  ,plus kids , lives with wife , works from home , has hx of hypertension , osteoarthritis , and hyperlipidemia , and ckd stage 3 , and DM T2, and hx of foot surgery , and chronic edema and lymphedema in legs , and presents with a few weeks of increasing edema and poor dm control and shortness of breath and now with inability to void , and increased weakness,   denies cp , fever, chills , nausea a, vomiting , head aches ,   patient admitted for CINDY on CKD , and hyperkalemia , and diabetes mellitus type 2 , and edema and fluid retention , on lasix , nephro to follow ,   on IV abx for ? uti ,   low sugar this am , got glucose , , will adjust insulin    continue aggressive diuresis , and follow labs , and weight      Problem/Plan - 1:  ·  Problem: Acute renal failure, unspecified acute renal failure type.  Plan: iv fluids , nephro consult.      Problem/Plan - 2:  ·  Problem: Hyperkalemia.      Problem/Plan - 3:  ·  Problem: Hypervolemia, unspecified hypervolemia type.      Problem/Plan - 4:  ·  Problem: Urinary tract infection without hematuria, site unspecified.      Problem/Plan - 5:  ·  Problem: BPH (benign prostatic hyperplasia).      Problem/Plan - 6:  Problem: Mixed hyperlipidemia.     Problem/Plan - 7:  ·  Problem: Renovascular hypertension.  Plan: bp meds.      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  Plan: fs coverage.      Problem/Plan - 9:  ·  Problem: Neuropathy due to type 2 diabetes mellitus.     Attending Attestation:   I was physically present for the key portions of the evaluation and management (E/M) service provided.  I agree with the above history, physical, and plan which I have reviewed and edited where appropriate.    Assessment and Plan:   · Assessment		  69 yo m ,  ,plus kids , lives with wife , works from home , has hx of hypertension , osteoarthritis , and hyperlipidemia , and ckd stage 3 , and DM T2, and hx of foot surgery , and chronic edema and lymphedema in legs , and presents with a few weeks of increasing edema and poor dm control and shortness of breath and now with inability to void , and increased weakness, now with cindy and ckd          Problem/Plan - 1:  ·  Problem: Acute renal failure, unspecified acute renal failure type.  Plan: Acute renal failure, unspecified acute renal failure type.    sig proteinurea may due to dm , htn, obesity   posiible atn vs ain   fluid overload continue with lasix 40 mg iv q 12 , gfr is improved with diuresis   Serum creatinine is stable at 2.7   , approximating GFR is decreased   ml/min.   There is  progression. No uremic symptoms   No evidence of anemia.  Fluid status stable.  Will continue to avoid nephrotoxic drugs.  Patient remains asymptomatic.   Continue current therapy.  hold  inhibitor. hold   ARB.      Problem/Plan - 2:  ·  Problem: Hyperkalemia.  Plan: is controlled ,      Problem/Plan - 3:  ·  Problem: Hypertension.  Plan: P monitoring,continue current antihypertensive meds, low salt diet,followup with PMD in 1-2 weeks.      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  Plan: fs coverage.       Electronic Signatures:  Pahlavan, Mohsen (MD)  (Signed 13-Jun-2018 11:59)  	Authored: Progress Note, Subjective and Objective, Assessment and Plan

## 2018-06-13 NOTE — DISCHARGE NOTE ADULT - SECONDARY DIAGNOSIS.
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

## 2018-06-13 NOTE — PROGRESS NOTE ADULT - ASSESSMENT
· Assessment		  67 yo m ,  ,plus kids , lives with wife , works from home , has hx of hypertension , osteoarthritis , and hyperlipidemia , and ckd stage 3 , and DM T2, and hx of foot surgery with severe deformity limiting ambulation, and chronic edema and lymphedema in legs , and presents with a few weeks of increasing edema and poor dm control and shortness of breath and now with inability to void , and increased weakness consistent with Acute on Chronic Diastolic CHF and URI.      Acute on chronic severe Diastolic heart failure in the setting of severe CKD.    Also with hyperkalemia and 2:1 AV block and poorly controlled hypertension on admission, resolved after normalization of K+    ECHO 8/5/16; TDS, probably normal EF, severe LVH, LAE  Lexiscan: 8/2016: Abnormal  CARDIAC CATH: 8/16/16: D2 50%, otherwise normal coronaries    RADIOLOGY/EKG: Diagnosis Line Sinus rhythm with 1st degree AV block  Poor R Wave Progression     CXR: CHF    PLAN  1 Cont IV lasix at 40 bid as per discussion with Dr. Mon.   2. No Ace/ARb  3. Continue to observe on remote tele, NSR overnight   4. BP still quite elevated.  -  increase amlodipine to 5mg BID  -  Change Toprol 50mg to coreg 6.25 mg BID and continue with Hydralazine 50mg 4 x day and Isordil 20 4 x day  5. ASA and statin for NON-OB CAD  6. DVT prophylaxis  7. Discussed with PMD this am about plan · Assessment		  67 yo m ,  ,plus kids , lives with wife , works from home , has hx of hypertension , osteoarthritis , and hyperlipidemia , and ckd stage 3 , and DM T2, and hx of foot surgery with severe deformity limiting ambulation, and chronic edema and lymphedema in legs , and presents with a few weeks of increasing edema and poor dm control and shortness of breath and now with inability to void , and increased weakness consistent with Acute on Chronic Diastolic CHF and URI.      Acute on chronic severe Diastolic heart failure in the setting of severe CKD.    Also with hyperkalemia and 2:1 AV block and poorly controlled hypertension on admission, resolved after normalization of K+    ECHO 8/5/16; TDS, probably normal EF, severe LVH, LAE  Lexiscan: 8/2016: Abnormal  CARDIAC CATH: 8/16/16: D2 50%, otherwise normal coronaries    RADIOLOGY/EKG: Diagnosis Line Sinus rhythm with 1st degree AV block  Poor R Wave Progression     CXR: CHF    PLAN  1 Cont IV lasix at 40 bid as per discussion with Dr. Mon.   2. No Ace/ARb  3. Continue to observe on remote tele, NSR overnight   4. BP still quite elevated, 150 to 190 SBP.  -  increase amlodipine to 5mg BID  -  Change Toprol 50mg to coreg 6.25 mg BID and continue with Hydralazine 50mg 4 x day and Isordil 20 4 x day  5. ASA and statin for NON-OB CAD  6. DVT prophylaxis  7. Discussed with PMD this am about plan

## 2018-06-14 LAB
ANION GAP SERPL CALC-SCNC: 8 MMOL/L — SIGNIFICANT CHANGE UP (ref 5–17)
APPEARANCE UR: CLEAR — SIGNIFICANT CHANGE UP
BILIRUB UR-MCNC: NEGATIVE — SIGNIFICANT CHANGE UP
BUN SERPL-MCNC: 62 MG/DL — HIGH (ref 7–23)
CALCIUM SERPL-MCNC: 8.4 MG/DL — LOW (ref 8.5–10.1)
CHLORIDE SERPL-SCNC: 108 MMOL/L — SIGNIFICANT CHANGE UP (ref 96–108)
CO2 SERPL-SCNC: 27 MMOL/L — SIGNIFICANT CHANGE UP (ref 22–31)
COLOR SPEC: YELLOW — SIGNIFICANT CHANGE UP
CREAT ?TM UR-MCNC: 34 MG/DL — SIGNIFICANT CHANGE UP
CREAT SERPL-MCNC: 2.8 MG/DL — HIGH (ref 0.5–1.3)
DIFF PNL FLD: NEGATIVE — SIGNIFICANT CHANGE UP
GLUCOSE SERPL-MCNC: 104 MG/DL — HIGH (ref 70–99)
GLUCOSE UR QL: NEGATIVE — SIGNIFICANT CHANGE UP
KETONES UR-MCNC: NEGATIVE — SIGNIFICANT CHANGE UP
LEUKOCYTE ESTERASE UR-ACNC: NEGATIVE — SIGNIFICANT CHANGE UP
MICROALBUMIN UR-MCNC: 114.9 MG/DL — SIGNIFICANT CHANGE UP
MICROALBUMIN/CREAT UR-RTO: 3379 MG/G — HIGH (ref 0–30)
NITRITE UR-MCNC: NEGATIVE — SIGNIFICANT CHANGE UP
PH UR: 5 — SIGNIFICANT CHANGE UP (ref 5–8)
POTASSIUM SERPL-MCNC: 4.4 MMOL/L — SIGNIFICANT CHANGE UP (ref 3.5–5.3)
POTASSIUM SERPL-SCNC: 4.4 MMOL/L — SIGNIFICANT CHANGE UP (ref 3.5–5.3)
PROT UR-MCNC: 500 MG/DL
SODIUM SERPL-SCNC: 143 MMOL/L — SIGNIFICANT CHANGE UP (ref 135–145)
SP GR SPEC: 1.01 — SIGNIFICANT CHANGE UP (ref 1.01–1.02)
UROBILINOGEN FLD QL: NEGATIVE — SIGNIFICANT CHANGE UP

## 2018-06-14 RX ORDER — POTASSIUM CHLORIDE 20 MEQ
1 PACKET (EA) ORAL
Qty: 30 | Refills: 0 | OUTPATIENT
Start: 2018-06-14 | End: 2018-07-13

## 2018-06-14 RX ORDER — ERGOCALCIFEROL 1.25 MG/1
1 CAPSULE ORAL
Qty: 4 | Refills: 1 | OUTPATIENT
Start: 2018-06-14 | End: 2018-08-12

## 2018-06-14 RX ORDER — FUROSEMIDE 40 MG
40 TABLET ORAL THREE TIMES A DAY
Qty: 0 | Refills: 0 | Status: COMPLETED | OUTPATIENT
Start: 2018-06-14 | End: 2018-06-14

## 2018-06-14 RX ORDER — LACTOBACILLUS ACIDOPHILUS 100MM CELL
1 CAPSULE ORAL
Qty: 60 | Refills: 0 | OUTPATIENT
Start: 2018-06-14 | End: 2018-07-13

## 2018-06-14 RX ORDER — POTASSIUM CHLORIDE 20 MEQ
10 PACKET (EA) ORAL DAILY
Qty: 0 | Refills: 0 | Status: DISCONTINUED | OUTPATIENT
Start: 2018-06-15 | End: 2018-06-15

## 2018-06-14 RX ORDER — ISOSORBIDE MONONITRATE 60 MG/1
1 TABLET, EXTENDED RELEASE ORAL
Qty: 30 | Refills: 1 | OUTPATIENT
Start: 2018-06-14 | End: 2018-08-12

## 2018-06-14 RX ORDER — HYDRALAZINE HCL 50 MG
75 TABLET ORAL EVERY 6 HOURS
Qty: 0 | Refills: 0 | Status: DISCONTINUED | OUTPATIENT
Start: 2018-06-14 | End: 2018-06-15

## 2018-06-14 RX ORDER — ISOSORBIDE MONONITRATE 60 MG/1
60 TABLET, EXTENDED RELEASE ORAL DAILY
Qty: 0 | Refills: 0 | Status: DISCONTINUED | OUTPATIENT
Start: 2018-06-14 | End: 2018-06-15

## 2018-06-14 RX ORDER — CARVEDILOL PHOSPHATE 80 MG/1
1 CAPSULE, EXTENDED RELEASE ORAL
Qty: 60 | Refills: 1 | OUTPATIENT
Start: 2018-06-14 | End: 2018-08-12

## 2018-06-14 RX ADMIN — Medication 50 MILLIGRAM(S): at 05:32

## 2018-06-14 RX ADMIN — Medication 40 MILLIGRAM(S): at 13:21

## 2018-06-14 RX ADMIN — Medication 100 MILLIGRAM(S): at 05:32

## 2018-06-14 RX ADMIN — Medication 40 MILLIGRAM(S): at 05:32

## 2018-06-14 RX ADMIN — Medication 1 TABLET(S): at 11:17

## 2018-06-14 RX ADMIN — Medication 75 MILLIGRAM(S): at 17:03

## 2018-06-14 RX ADMIN — ISOSORBIDE MONONITRATE 60 MILLIGRAM(S): 60 TABLET, EXTENDED RELEASE ORAL at 11:16

## 2018-06-14 RX ADMIN — ENOXAPARIN SODIUM 40 MILLIGRAM(S): 100 INJECTION SUBCUTANEOUS at 11:16

## 2018-06-14 RX ADMIN — NYSTATIN CREAM 1 APPLICATION(S): 100000 CREAM TOPICAL at 17:05

## 2018-06-14 RX ADMIN — Medication 50 MILLIGRAM(S): at 00:09

## 2018-06-14 RX ADMIN — MAGNESIUM OXIDE 400 MG ORAL TABLET 400 MILLIGRAM(S): 241.3 TABLET ORAL at 17:03

## 2018-06-14 RX ADMIN — Medication 75 MILLIGRAM(S): at 11:16

## 2018-06-14 RX ADMIN — AMLODIPINE BESYLATE 10 MILLIGRAM(S): 2.5 TABLET ORAL at 05:31

## 2018-06-14 RX ADMIN — ATORVASTATIN CALCIUM 20 MILLIGRAM(S): 80 TABLET, FILM COATED ORAL at 21:39

## 2018-06-14 RX ADMIN — Medication 75 MILLIGRAM(S): at 23:56

## 2018-06-14 RX ADMIN — NYSTATIN CREAM 1 APPLICATION(S): 100000 CREAM TOPICAL at 05:44

## 2018-06-14 RX ADMIN — Medication 1 TABLET(S): at 07:59

## 2018-06-14 RX ADMIN — Medication 81 MILLIGRAM(S): at 11:16

## 2018-06-14 RX ADMIN — Medication 1 TABLET(S): at 17:03

## 2018-06-14 RX ADMIN — ISOSORBIDE DINITRATE 20 MILLIGRAM(S): 5 TABLET ORAL at 05:32

## 2018-06-14 RX ADMIN — MAGNESIUM OXIDE 400 MG ORAL TABLET 400 MILLIGRAM(S): 241.3 TABLET ORAL at 07:59

## 2018-06-14 RX ADMIN — Medication 40 MILLIGRAM(S): at 21:39

## 2018-06-14 NOTE — PROGRESS NOTE ADULT - SUBJECTIVE AND OBJECTIVE BOX
PCP  Subjective:   in chair , walked yesterday , feels well , reviewed labs and protocols and issues with kidney and probable diuresis as out patient too    Objective:   Vital Signs Last 24 Hrs  T(C): 37.1 (18 @ 04:53), Max: 37.1 (18 @ 04:53)  T(F): 98.7 (18 @ 04:53), Max: 98.7 (18 @ 04:53)  HR: 61 (18 @ 04:53) (50 - 66)  BP: 164/60 (18 @ 01:31) (164/60 - 194/84)  BP(mean): --  RR: 18 (18 @ 04:53) (18 - 18)  SpO2: 93% (18 @ 04:53) (93% - 96%)  Daily     Daily Weight in k.3 (2018 04:53)    GENERAL:  wdwn obese male , awake and alert , responsive ,   EYES: eomi jose  NECK: supple no masses  CHEST/LUNG: clear , some poor air movement in bases  HEART: s1 s2 regular ,   ABDOMEN:  obese , large panus , and rash under belly non tender , lymphedema in abdominal wall   EXTREMITIES:  edema pitting bilateral ,   SKIN:  some hyperpigmentation bilateral legs ,   CNS:  awake , alert non focal , ambulates, responsive ,   MSK: FROM in joints and no swelling       Allergies: Allergies    adhesives (Rash)  No Known Drug Allergies    Intolerances        Home Medications:  acetaminophen 325 mg oral tablet: 2 tab(s) orally every 6 hours, As needed, Mild Pain (1 - 3) (2018 16:21)  amLODIPine 5 mg oral tablet: 1 tab(s) orally once a day (2018 21:35)  aspirin 81 mg oral tablet: 1 tab(s) orally once a day (2018 18:32)  atorvastatin 20 mg oral tablet: 1 tab(s) orally once a day (at bedtime) (2018 16:21)  hydrALAZINE 25 mg oral tablet:  orally once a day (2018 18:32)  hydrALAZINE 50 mg oral tablet: 1 tab(s) orally every 6 hours (2018 16:21)  Lantus:  subcutaneous  (2018 18:32)  losartan 50 mg oral tablet: 1 tab(s) orally once a day (2018 18:32)  magnesium oxide 400 mg (241.3 mg elemental magnesium) oral tablet: 1 tab(s) orally 2 times a day (with meals) (2018 16:21)  multivitamin:  (2018 16:21)  nystatin 100,000 units/g topical powder: 1 application topically 2 times a day (2018 16:21)  tamsulosin 0.4 mg oral capsule: 1 cap(s) orally once a day (2018 18:32)  verapamil 240 mg/12 hours oral tablet, extended release: 1 tab(s) orally once a day (in the morning) (2018 18:32)    Medications:   acetaminophen   Tablet. 650 milliGRAM(s) Oral every 6 hours PRN  amLODIPine   Tablet 10 milliGRAM(s) Oral daily  aspirin enteric coated 81 milliGRAM(s) Oral daily  atorvastatin 20 milliGRAM(s) Oral at bedtime  carvedilol 6.25 milliGRAM(s) Oral every 12 hours  cloNIDine Patch 0.1 mG/24Hr(s) 1 patch Topical every 7 days  dextrose 40% Gel 15 Gram(s) Oral once PRN  dextrose 5%. 1000 milliLiter(s) IV Continuous <Continuous>  dextrose 50% Injectable 12.5 Gram(s) IV Push once  dextrose 50% Injectable 25 Gram(s) IV Push once  dextrose 50% Injectable 25 Gram(s) IV Push once  enoxaparin Injectable 40 milliGRAM(s) SubCutaneous daily  ergocalciferol 65108 Unit(s) Oral every week  furosemide   Injectable 40 milliGRAM(s) IV Push three times a day  glucagon  Injectable 1 milliGRAM(s) IntraMuscular once PRN  guaiFENesin   Syrup  (Sugar-Free) 100 milliGRAM(s) Oral every 6 hours PRN  hydrALAZINE 50 milliGRAM(s) Oral every 6 hours  insulin lispro (HumaLOG) corrective regimen sliding scale   SubCutaneous three times a day before meals  isosorbide   mononitrate ER Tablet (IMDUR) 60 milliGRAM(s) Oral daily  lactobacillus acidophilus 1 Tablet(s) Oral two times a day with meals  magnesium oxide 400 milliGRAM(s) Oral two times a day with meals  Nephro-jessica 1 Tablet(s) Oral daily  nystatin Powder 1 Application(s) Topical two times a day      LABS:                        11.2   5.97  )-----------( 203      ( 2018 06:09 )             35.2     -14    143  |  108  |  62<H>  ----------------------------<  104<H>  4.4   |  27  |  2.80<H>    Ca    8.4<L>      2018 07:29  Phos  3.0       Mg     1.8               Urinalysis Basic - ( 2018 07:21 )    Color: Yellow / Appearance: Clear / S.010 / pH: x  Gluc: x / Ketone: Negative  / Bili: Negative / Urobili: Negative   Blood: x / Protein: 500 mg/dL / Nitrite: Negative   Leuk Esterase: Negative / RBC: 0-2 /HPF / WBC 3-5   Sq Epi: x / Non Sq Epi: Occasional / Bacteria: Negative        CAPILLARY BLOOD GLUCOSE      POCT Blood Glucose.: 108 mg/dL (2018 07:27)  POCT Blood Glucose.: 131 mg/dL (2018 21:13)  POCT Blood Glucose.: 113 mg/dL (2018 16:19)  POCT Blood Glucose.: 134 mg/dL (2018 11:09)          RECENT CULTURES:

## 2018-06-14 NOTE — PROGRESS NOTE ADULT - SUBJECTIVE AND OBJECTIVE BOX
Patient is a 68y Male whom presented to the hospital with ckd and venkata     pt seen and examined , no complains , no cp , no sob       PAST MEDICAL & SURGICAL HISTORY:  BPH (benign prostatic hyperplasia)  Hyperlipemia  Hypertension  No significant past surgical history      REVIEW OF SYSTEMS:    CONSTITUTIONAL: No weakness, fevers or chills  NECK: No pain or stiffness  RESPIRATORY: No cough, wheezing, hemoptysis; No shortness of breath  CARDIOVASCULAR: No chest pain or palpitations  GASTROINTESTINAL: No abdominal or epigastric pain. No nausea, vomiting,     No diarrhea or constipation. No melena                                                               11.2   5.97  )-----------( 203      ( 2018 06:09 )             35.2       CBC Full  -  ( 2018 06:09 )  WBC Count : 5.97 K/uL  Hemoglobin : 11.2 g/dL  Hematocrit : 35.2 %  Platelet Count - Automated : 203 K/uL  Mean Cell Volume : 90.0 fl  Mean Cell Hemoglobin : 28.6 pg  Mean Cell Hemoglobin Concentration : 31.8 gm/dL  Auto Neutrophil # : x  Auto Lymphocyte # : x  Auto Monocyte # : x  Auto Eosinophil # : x  Auto Basophil # : x  Auto Neutrophil % : x  Auto Lymphocyte % : x  Auto Monocyte % : x  Auto Eosinophil % : x  Auto Basophil % : x      06-14    143  |  108  |  62<H>  ----------------------------<  104<H>  4.4   |  27  |  2.80<H>    Ca    8.4<L>      2018 07:29  Phos  3.0     06-13  Mg     1.8     06-13        CAPILLARY BLOOD GLUCOSE      POCT Blood Glucose.: 113 mg/dL (2018 11:27)  POCT Blood Glucose.: 108 mg/dL (2018 07:27)  POCT Blood Glucose.: 131 mg/dL (2018 21:13)  POCT Blood Glucose.: 113 mg/dL (2018 16:19)      Vital Signs Last 24 Hrs  T(C): 36.6 (2018 11:28), Max: 37.1 (2018 04:53)  T(F): 97.9 (2018 11:28), Max: 98.7 (2018 04:53)  HR: 67 (2018 11:28) (57 - 67)  BP: 160/60 (2018 09:23) (160/60 - 194/84)  BP(mean): --  RR: 19 (2018 11:28) (18 - 19)  SpO2: 96% (2018 11:28) (93% - 96%)    Urinalysis Basic - ( 2018 07:21 )    Color: Yellow / Appearance: Clear / S.010 / pH: x  Gluc: x / Ketone: Negative  / Bili: Negative / Urobili: Negative   Blood: x / Protein: 500 mg/dL / Nitrite: Negative   Leuk Esterase: Negative / RBC: 0-2 /HPF / WBC 3-5   Sq Epi: x / Non Sq Epi: Occasional / Bacteria: Negative              PHYSICAL EXAM:    Constitutional: NAD  HEENT: conjunctive   clear   Neck:  No JVD  Respiratory: CTAB  Cardiovascular: S1 and S2  Gastrointestinal: BS+, soft, NT/ND  Extremities: pos 3 plus  peripheral edema  Neurological: A/O x 3, no focal deficits  Psychiatric: Normal mood, normal affect  : No Sidhu  Skin: No rashes  Access: Not applicable

## 2018-06-14 NOTE — PROGRESS NOTE ADULT - SUBJECTIVE AND OBJECTIVE BOX
[facility] Cardiology    CHIEF COMPLAINT: Patient is a 68y old  Male who presents with a chief complaint of Increased Swelling in bilateral lower extremities (13 Jun 2018 16:19)      Follow Up: [ ] Chest Pain      [ ] Dyspnea     [ ] Palpitations    [ ] Atrial Fibrillation     [ ] Ventricular Dysrhythmia    [ ] Abnormal EKG                      [ ] Abnormal Cardiac Enzymes     [ ] Valvular Disease    HPI:  67 yo m ,  ,plus kids , lives with wife , works from home , has hx of hypertension , osteoarthritis , and hyperlipidemia , and ckd stage 3 , and DM T2, and hx of foot surgery , and chronic edema and lymphedema in legs , and presents with a few weeks of increasing edema and poor dm control and shortness of breath and now with inability to void , and increased weakness,   denies cp , fever, chills , nausea a, vomiting , head aches , (07 Jun 2018 21:33)    elevated blood pressure overnight  Systolic BP> 160  Bradycardia overnight, HR 50s    PAST MEDICAL & SURGICAL HISTORY:  BPH (benign prostatic hyperplasia)  Hyperlipemia  Hypertension  No significant past surgical history      MEDICATIONS  (STANDING):  amLODIPine   Tablet 10 milliGRAM(s) Oral daily  aspirin enteric coated 81 milliGRAM(s) Oral daily  atorvastatin 20 milliGRAM(s) Oral at bedtime  carvedilol 6.25 milliGRAM(s) Oral every 12 hours  cloNIDine Patch 0.1 mG/24Hr(s) 1 patch Topical every 7 days  dextrose 5%. 1000 milliLiter(s) (50 mL/Hr) IV Continuous <Continuous>  dextrose 50% Injectable 12.5 Gram(s) IV Push once  dextrose 50% Injectable 25 Gram(s) IV Push once  dextrose 50% Injectable 25 Gram(s) IV Push once  enoxaparin Injectable 40 milliGRAM(s) SubCutaneous daily  ergocalciferol 60635 Unit(s) Oral every week  furosemide   Injectable 40 milliGRAM(s) IV Push three times a day  hydrALAZINE 75 milliGRAM(s) Oral every 6 hours  insulin lispro (HumaLOG) corrective regimen sliding scale   SubCutaneous three times a day before meals  isosorbide   mononitrate ER Tablet (IMDUR) 60 milliGRAM(s) Oral daily  lactobacillus acidophilus 1 Tablet(s) Oral two times a day with meals  magnesium oxide 400 milliGRAM(s) Oral two times a day with meals  Nephro-jessica 1 Tablet(s) Oral daily  nystatin Powder 1 Application(s) Topical two times a day    MEDICATIONS  (PRN):  acetaminophen   Tablet. 650 milliGRAM(s) Oral every 6 hours PRN Mild Pain (1 - 3)  dextrose 40% Gel 15 Gram(s) Oral once PRN Blood Glucose LESS THAN 70 milliGRAM(s)/deciliter  glucagon  Injectable 1 milliGRAM(s) IntraMuscular once PRN Glucose LESS THAN 70 milligrams/deciliter  guaiFENesin   Syrup  (Sugar-Free) 100 milliGRAM(s) Oral every 6 hours PRN Cough      Allergies    adhesives (Rash)  No Known Drug Allergies    Intolerances        REVIEW OF SYSTEMS:    CONSTITUTIONAL: No weakness, fevers or chills.   EYES/ENT: No visual changes;  No vertigo or throat pain   NECK: No pain or stiffness  RESPIRATORY: No cough, wheezing, hemoptysis; No shortness of breath  CARDIOVASCULAR: No chest pain or palpitations  GASTROINTESTINAL: No abdominal or epigastric pain. No nausea, vomiting, or hematemesis; No diarrhea or constipation. No melena or hematochezia.  GENITOURINARY: No dysuria, frequency or hematuria  NEUROLOGICAL: No numbness or weakness  SKIN: No itching, burning, rashes, or lesions   All other review of systems is negative unless indicated above    Vital Signs Last 24 Hrs  T(C): 36.6 (14 Jun 2018 09:23), Max: 37.1 (14 Jun 2018 04:53)  T(F): 97.9 (14 Jun 2018 09:23), Max: 98.7 (14 Jun 2018 04:53)  HR: 57 (14 Jun 2018 09:23) (50 - 66)  BP: 160/60 (14 Jun 2018 09:23) (160/60 - 194/84)  BP(mean): --  RR: 18 (14 Jun 2018 09:23) (18 - 18)  SpO2: 95% (14 Jun 2018 09:23) (93% - 96%)    I&O's Summary      PHYSICAL EXAM:  Constitutional: NAD, awake and alert, well-developed  Eyes:  EOMI,  Pupils round, No oral cyanosis.  HEENT: No exudate or erythema  Pulmonary: Non-labored, breath sounds are clear bilaterally, No wheezing, rales or rhonchi  Cardiovascular: Regular, S1 and S2, Soft sys murmur  Gastrointestinal: Obese Bowel Sounds present, soft, nontender.   Ext: 2-3+ pitting edema into knees  Neurological: Alert, no gross focal motor deficits  Skin: No rashes.  Psych:  Mood & affect appropriate      LABS: All Labs Reviewed:                        11.2   5.97  )-----------( 203      ( 13 Jun 2018 06:09 )             35.2     06-14    143  |  108  |  62<H>  ----------------------------<  104<H>  4.4   |  27  |  2.80<H>    Ca    8.4<L>      14 Jun 2018 07:29  Phos  3.0     06-13  Mg     1.8     06-13    · Assessment		  67 yo m ,  ,plus kids , lives with wife , works from home , has hx of hypertension , osteoarthritis , and hyperlipidemia , and ckd stage 3 , and DM T2, and hx of foot surgery with severe deformity limiting ambulation, and chronic edema and lymphedema in legs , and presents with a few weeks of increasing edema and poor dm control and shortness of breath and now with inability to void , and increased weakness consistent with Acute on Chronic Diastolic CHF and URI.      Acute on chronic severe Diastolic heart failure in the setting of severe CKD.    Also with hyperkalemia and 2:1 AV block and poorly controlled hypertension on admission, resolved after normalization of K+    ECHO 8/5/16; TDS, probably normal EF, severe LVH, LAE  Lexiscan: 8/2016: Abnormal  CARDIAC CATH: 8/16/16: D2 50%, otherwise normal coronaries    RADIOLOGY/EKG: Diagnosis Line Sinus rhythm with 1st degree AV block  Poor R Wave Progression     CXR: CHF    PLAN  1 IV increased to lasix at 40 TID.   2. No Ace/ARb given CKD  3. Continue to observe on remote tele, bradycardia on low dose coreg. Would hold clonidine for HR <60 bpm or SBP <100  4. BP still quite elevated, 150 to 190 SBP. ON amlodipine to 10 mg. Would increase Hydralazine to 75 mg q 6 H. Add imdur 60.  5. ASA and statin for NON-OB CAD  6. DVT prophylaxis  7. Discussed with PMD this am about plan  8. Pt sees me outpt.

## 2018-06-14 NOTE — PROGRESS NOTE ADULT - ASSESSMENT
· Assessment		  67 yo m ,  ,plus kids , lives with wife , works from home , has hx of hypertension , osteoarthritis , and hyperlipidemia , and ckd stage 3 , and DM T2, and hx of foot surgery , and chronic edema and lymphedema in legs , and presents with a few weeks of increasing edema and poor dm control and shortness of breath and now with inability to void , and increased weakness,   denies cp , fever, chills , nausea a, vomiting , head aches ,   patient admitted for CINDY on CKD , and hyperkalemia , and diabetes mellitus type 2 , and edema and fluid retention , on lasix , nephro to follow ,   on IV abx for ? uti ,   low sugar this am , got glucose , , will adjust insulin  on 6/15/18 dc iv abx , f/u labs , f/u labs , diet exs, ? nephrotic syndrome adjust meds , bp still high add clonidine     continue aggressive diuresis , and follow labs , and weight      Problem/Plan - 1:  ·  Problem: Acute renal failure, unspecified acute renal failure type.  Plan: iv fluids , nephro consult.      Problem/Plan - 2:  ·  Problem: Hyperkalemia.      Problem/Plan - 3:  ·  Problem: Hypervolemia, unspecified hypervolemia type.      Problem/Plan - 4:  ·  Problem: Urinary tract infection without hematuria, site unspecified.      Problem/Plan - 5:  ·  Problem: BPH (benign prostatic hyperplasia).      Problem/Plan - 6:  Problem: Mixed hyperlipidemia.     Problem/Plan - 7:  ·  Problem: Renovascular hypertension.  Plan: bp meds.      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  Plan: fs coverage.      Problem/Plan - 9:  ·  Problem: Neuropathy due to type 2 diabetes mellitus.     Attending Attestation:   I was physically present for the key portions of the evaluation and management (E/M) service provided.  I agree with the above history, physical, and plan which I have reviewed and edited where appropriate.    Assessment and Plan:   · Assessment		  67 yo m ,  ,plus kids , lives with wife , works from home , has hx of hypertension , osteoarthritis , and hyperlipidemia , and ckd stage 3 , and DM T2, and hx of foot surgery , and chronic edema and lymphedema in legs , and presents with a few weeks of increasing edema and poor dm control and shortness of breath and now with inability to void , and increased weakness, now with cindy and ckd          Problem/Plan - 1:  ·  Problem: Acute renal failure, unspecified acute renal failure type.  Plan: Acute renal failure, unspecified acute renal failure type.    sig proteinurea may due to dm , htn, obesity   posiible atn vs ain   fluid overload continue with lasix 40 mg iv q 12 , gfr is improved with diuresis   Serum creatinine is stable at 2.7   , approximating GFR is decreased   ml/min.   There is  progression. No uremic symptoms   No evidence of anemia.  Fluid status stable.  Will continue to avoid nephrotoxic drugs.  Patient remains asymptomatic.   Continue current therapy.  hold  inhibitor. hold   ARB.      Problem/Plan - 2:  ·  Problem: Hyperkalemia.  Plan: is controlled ,      Problem/Plan - 3:  ·  Problem: Hypertension.  Plan: P monitoring, continue current antihypertensive meds, low salt diet, followup with PMD in 1-2 weeks.      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  Plan: fs coverage.       Electronic Signatures:  Pahlavan,Mohsen (MD)  (Signed 13-Jun-2018 11:59)  	Authored: Progress Note, Subjective and Objective, Assessment and Plan

## 2018-06-15 VITALS — RESPIRATION RATE: 18 BRPM | HEART RATE: 63 BPM | TEMPERATURE: 98 F | OXYGEN SATURATION: 93 %

## 2018-06-15 LAB
ANION GAP SERPL CALC-SCNC: 9 MMOL/L — SIGNIFICANT CHANGE UP (ref 5–17)
BUN SERPL-MCNC: 63 MG/DL — HIGH (ref 7–23)
CALCIUM SERPL-MCNC: 8.7 MG/DL — SIGNIFICANT CHANGE UP (ref 8.5–10.1)
CHLORIDE SERPL-SCNC: 105 MMOL/L — SIGNIFICANT CHANGE UP (ref 96–108)
CO2 SERPL-SCNC: 27 MMOL/L — SIGNIFICANT CHANGE UP (ref 22–31)
CREAT SERPL-MCNC: 2.7 MG/DL — HIGH (ref 0.5–1.3)
GLUCOSE SERPL-MCNC: 134 MG/DL — HIGH (ref 70–99)
POTASSIUM SERPL-MCNC: 4.2 MMOL/L — SIGNIFICANT CHANGE UP (ref 3.5–5.3)
POTASSIUM SERPL-SCNC: 4.2 MMOL/L — SIGNIFICANT CHANGE UP (ref 3.5–5.3)
SODIUM SERPL-SCNC: 141 MMOL/L — SIGNIFICANT CHANGE UP (ref 135–145)

## 2018-06-15 PROCEDURE — 84480 ASSAY TRIIODOTHYRONINE (T3): CPT

## 2018-06-15 PROCEDURE — 80076 HEPATIC FUNCTION PANEL: CPT

## 2018-06-15 PROCEDURE — 87389 HIV-1 AG W/HIV-1&-2 AB AG IA: CPT

## 2018-06-15 PROCEDURE — 85027 COMPLETE CBC AUTOMATED: CPT

## 2018-06-15 PROCEDURE — 81001 URINALYSIS AUTO W/SCOPE: CPT

## 2018-06-15 PROCEDURE — 80048 BASIC METABOLIC PNL TOTAL CA: CPT

## 2018-06-15 PROCEDURE — 97530 THERAPEUTIC ACTIVITIES: CPT

## 2018-06-15 PROCEDURE — 84156 ASSAY OF PROTEIN URINE: CPT

## 2018-06-15 PROCEDURE — 83036 HEMOGLOBIN GLYCOSYLATED A1C: CPT

## 2018-06-15 PROCEDURE — 86335 IMMUNFIX E-PHORSIS/URINE/CSF: CPT

## 2018-06-15 PROCEDURE — 71046 X-RAY EXAM CHEST 2 VIEWS: CPT

## 2018-06-15 PROCEDURE — 76775 US EXAM ABDO BACK WALL LIM: CPT

## 2018-06-15 PROCEDURE — 82784 ASSAY IGA/IGD/IGG/IGM EACH: CPT

## 2018-06-15 PROCEDURE — 83970 ASSAY OF PARATHORMONE: CPT

## 2018-06-15 PROCEDURE — 84484 ASSAY OF TROPONIN QUANT: CPT

## 2018-06-15 PROCEDURE — 83880 ASSAY OF NATRIURETIC PEPTIDE: CPT

## 2018-06-15 PROCEDURE — 82553 CREATINE MB FRACTION: CPT

## 2018-06-15 PROCEDURE — 80074 ACUTE HEPATITIS PANEL: CPT

## 2018-06-15 PROCEDURE — 86160 COMPLEMENT ANTIGEN: CPT

## 2018-06-15 PROCEDURE — 99291 CRITICAL CARE FIRST HOUR: CPT | Mod: 25

## 2018-06-15 PROCEDURE — 84550 ASSAY OF BLOOD/URIC ACID: CPT

## 2018-06-15 PROCEDURE — 82962 GLUCOSE BLOOD TEST: CPT

## 2018-06-15 PROCEDURE — 82787 IGG 1 2 3 OR 4 EACH: CPT

## 2018-06-15 PROCEDURE — 80061 LIPID PANEL: CPT

## 2018-06-15 PROCEDURE — 85730 THROMBOPLASTIN TIME PARTIAL: CPT

## 2018-06-15 PROCEDURE — 86226 DNA ANTIBODY SINGLE STRAND: CPT

## 2018-06-15 PROCEDURE — 84560 ASSAY OF URINE/URIC ACID: CPT

## 2018-06-15 PROCEDURE — 86325 OTHER IMMUNOELECTROPHORESIS: CPT

## 2018-06-15 PROCEDURE — 84100 ASSAY OF PHOSPHORUS: CPT

## 2018-06-15 PROCEDURE — 82550 ASSAY OF CK (CPK): CPT

## 2018-06-15 PROCEDURE — 84443 ASSAY THYROID STIM HORMONE: CPT

## 2018-06-15 PROCEDURE — 86038 ANTINUCLEAR ANTIBODIES: CPT

## 2018-06-15 PROCEDURE — 84436 ASSAY OF TOTAL THYROXINE: CPT

## 2018-06-15 PROCEDURE — 84300 ASSAY OF URINE SODIUM: CPT

## 2018-06-15 PROCEDURE — 80053 COMPREHEN METABOLIC PANEL: CPT

## 2018-06-15 PROCEDURE — 82043 UR ALBUMIN QUANTITATIVE: CPT

## 2018-06-15 PROCEDURE — 93306 TTE W/DOPPLER COMPLETE: CPT

## 2018-06-15 PROCEDURE — 97116 GAIT TRAINING THERAPY: CPT

## 2018-06-15 PROCEDURE — 83735 ASSAY OF MAGNESIUM: CPT

## 2018-06-15 PROCEDURE — 97162 PT EVAL MOD COMPLEX 30 MIN: CPT

## 2018-06-15 PROCEDURE — 85610 PROTHROMBIN TIME: CPT

## 2018-06-15 PROCEDURE — 82310 ASSAY OF CALCIUM: CPT

## 2018-06-15 PROCEDURE — 93005 ELECTROCARDIOGRAM TRACING: CPT

## 2018-06-15 PROCEDURE — 87205 SMEAR GRAM STAIN: CPT

## 2018-06-15 PROCEDURE — 86334 IMMUNOFIX E-PHORESIS SERUM: CPT

## 2018-06-15 RX ORDER — HYDRALAZINE HCL 50 MG
1.5 TABLET ORAL
Qty: 180 | Refills: 0 | OUTPATIENT
Start: 2018-06-15 | End: 2018-07-14

## 2018-06-15 RX ADMIN — Medication 81 MILLIGRAM(S): at 13:06

## 2018-06-15 RX ADMIN — Medication 75 MILLIGRAM(S): at 05:21

## 2018-06-15 RX ADMIN — Medication 75 MILLIGRAM(S): at 11:37

## 2018-06-15 RX ADMIN — ENOXAPARIN SODIUM 40 MILLIGRAM(S): 100 INJECTION SUBCUTANEOUS at 13:06

## 2018-06-15 RX ADMIN — MAGNESIUM OXIDE 400 MG ORAL TABLET 400 MILLIGRAM(S): 241.3 TABLET ORAL at 07:55

## 2018-06-15 RX ADMIN — AMLODIPINE BESYLATE 10 MILLIGRAM(S): 2.5 TABLET ORAL at 05:21

## 2018-06-15 RX ADMIN — Medication 1 TABLET(S): at 11:37

## 2018-06-15 RX ADMIN — NYSTATIN CREAM 1 APPLICATION(S): 100000 CREAM TOPICAL at 05:21

## 2018-06-15 RX ADMIN — Medication 20 MILLIGRAM(S): at 06:08

## 2018-06-15 RX ADMIN — Medication 10 MILLIEQUIVALENT(S): at 11:37

## 2018-06-15 RX ADMIN — Medication 1 TABLET(S): at 07:55

## 2018-06-15 RX ADMIN — ISOSORBIDE MONONITRATE 60 MILLIGRAM(S): 60 TABLET, EXTENDED RELEASE ORAL at 11:37

## 2018-06-15 NOTE — PROGRESS NOTE ADULT - PROBLEM SELECTOR PROBLEM 6
Mixed hyperlipidemia

## 2018-06-15 NOTE — PROGRESS NOTE ADULT - PROBLEM SELECTOR PROBLEM 3
Hypertension
Hypervolemia, unspecified hypervolemia type
Hypertension
Hypervolemia, unspecified hypervolemia type
Hypertension

## 2018-06-15 NOTE — PROGRESS NOTE ADULT - ASSESSMENT
67 yo m ,  ,plus kids , lives with wife , works from home , has hx of hypertension , osteoarthritis , and hyperlipidemia , and ckd stage 3 , and DM T2, and hx of foot surgery with severe deformity limiting ambulation, and chronic edema and lymphedema in legs , and presents with a few weeks of increasing edema and poor dm control and shortness of breath and now with inability to void , and increased weakness consistent with Acute on Chronic Diastolic CHF and URI.      Acute on chronic severe Diastolic heart failure in the setting of severe CKD.    Also with hyperkalemia and 2:1 AV block and poorly controlled hypertension on admission, resolved after normalization of K+    ECHO 8/5/16; TDS, probably normal EF, severe LVH, LAE  Lexiscan: 8/2016: Abnormal  CARDIAC CATH: 8/16/16: D2 50%, otherwise normal coronaries    RADIOLOGY/EKG: Diagnosis Line Sinus rhythm with 1st degree AV block  Poor R Wave Progression     CXR: CHF    PLAN  1 IV increased to lasix at 40 TID.   2. No Ace/ARb given CKD  3. Continue to observe on remote tele, bradycardia on low dose coreg. Would hold coreg for HR <60 bpm or SBP <100  4. BP still quite elevated, 150 to 190 SBP. ON amlodipine to 10 mg. Would increase Hydralazine to 75 mg q 6 H. Add imdur 60.  5. ASA and statin for NON-OB CAD  6. DVT prophylaxis  7. Discussed with PMD this am about plan  8. Pt sees me Dr. Lo. 69 yo m ,  ,plus kids , lives with wife , works from home , has hx of hypertension , osteoarthritis , and hyperlipidemia , and ckd stage 3 , and DM T2, and hx of foot surgery with severe deformity limiting ambulation, and chronic edema and lymphedema in legs , and presents with a few weeks of increasing edema and poor dm control and shortness of breath and now with inability to void , and increased weakness consistent with Acute on Chronic Diastolic CHF and URI.      Acute on chronic severe Diastolic heart failure in the setting of severe CKD.    Also with hyperkalemia and 2:1 AV block and poorly controlled hypertension on admission, resolved after normalization of K+    ECHO 8/5/16; TDS, probably normal EF, severe LVH, LAE  Lexiscan: 8/2016: Abnormal  CARDIAC CATH: 8/16/16: D2 50%, otherwise normal coronaries    RADIOLOGY/EKG: Diagnosis Line Sinus rhythm with 1st degree AV block  Poor R Wave Progression     CXR: CHF    PLAN  1 If discharged, ok to change lasix at 60mg BID.   2. Would consult renal to start Ace/ARb given CKD  3. Continue to observe on remote tele, bradycardia on low dose coreg. Would hold coreg for HR <60 bpm or SBP <100  4. BP still quite elevated, 150 to 190 SBP. Continue amlodipine to 10 mg. Continue Hydralazine to 75 mg q 6 H. Continue imdur 60.  5. ASA and statin for NON-OB CAD  6. DVT prophylaxis  7. Pt to see Dr. Lo in < 1 week.

## 2018-06-15 NOTE — PROGRESS NOTE ADULT - PROBLEM SELECTOR PROBLEM 1
Acute renal failure, unspecified acute renal failure type

## 2018-06-15 NOTE — PROGRESS NOTE ADULT - PROBLEM SELECTOR PLAN 1
Acute renal failure, unspecified acute renal failure type. grr is controlled    sig proteinurea may due to dm , htn, obesity pt refused kidney biopsy ,   pt wants to loose weight and will consider kidney biopsy    posiible atn vs ain , dm nephropathy   fluid overload continue with lasix 40 mg iv q 8 hr  , gfr is improved with diuresis   Serum creatinine is stable at 2.8   , approximating GFR is decreased   ml/min.   There is  progression. No uremic symptoms   No evidence of anemia.  Fluid status stable.  Will continue to avoid nephrotoxic drugs.  Patient remains asymptomatic.   Continue current therapy.  hold  inhibitor. hold   ARB.
iv fluids , nephro consult
Acute renal failure, unspecified acute renal failure type.    sig proteinurea may due to dm , htn, obesity   posiible atn vs ain   fluid overload continue with lasix 40 mg iv q 12 , gfr is improved with diuresis   Serum creatinine is stable at 2.7   , approximating GFR is decreased   ml/min.   There is  progression. No uremic symptoms   No evidence of anemia.  Fluid status stable.  Will continue to avoid nephrotoxic drugs.  Patient remains asymptomatic.   Continue current therapy.  hold  inhibitor. hold   ARB.
Acute renal failure, unspecified acute renal failure type.    sig proteinurea may due to dm , htn, obesity   posiible atn vs ain   fluid overload continue with lasix 40 mg iv q 12 , gfr is improved with diuresis   Serum creatinine is stable at 2.8   , approximating GFR is decreased   ml/min.   There is  progression. No uremic symptoms   No evidence of anemia.  Fluid status stable.  Will continue to avoid nephrotoxic drugs.  Patient remains asymptomatic.   Continue current therapy.  hold  inhibitor. hold   ARB.
Acute renal failure, unspecified acute renal failure type.    sig proteinurea may due to dm , htn, obesity   posiible atn vs ain , dm nephropathy   fluid overload continue with lasix 40 mg iv q 8 hr  , gfr is improved with diuresis   Serum creatinine is stable at 2.8   , approximating GFR is decreased   ml/min.   There is  progression. No uremic symptoms   No evidence of anemia.  Fluid status stable.  Will continue to avoid nephrotoxic drugs.  Patient remains asymptomatic.   Continue current therapy.  hold  inhibitor. hold   ARB.
Acute renal failure, unspecified acute renal failure type. Recommendation: ACUTE RENAL FAILURE: sig proteinurea may due to dn , htn, obesity   posiible atn vs ain   fluid overload continue with lasix 40 mg iv q 12   venkata wallis in progress   will get us kidney  due to poor dm controlled and diuresis    Serum creatinine is stable at 3.1   , approximating GFR is decreased   ml/min.   There is  progression. No uremic symptoms   No evidence of anemia.  Fluid status stable.  Will continue to avoid nephrotoxic drugs.  Patient remains asymptomatic.   Continue current therapy.  hold  inhibitor. hold   ARB.
Acute renal failure, unspecified acute renal failure type. Recommendation: ACUTE RENAL FAILURE: sig proteinurea may due to dn , htn, obesity   posiible atn vs ain   fluid overload continue with lasix 40 mg iv q 12 , gfr is the same   due to poor dm controlled and diuresis    Serum creatinine is stable at 3.1   , approximating GFR is decreased   ml/min.   There is  progression. No uremic symptoms   No evidence of anemia.  Fluid status stable.  Will continue to avoid nephrotoxic drugs.  Patient remains asymptomatic.   Continue current therapy.  hold  inhibitor. hold   ARB.
Acute renal failure, unspecified acute renal failure type. Recommendation: ACUTE RENAL FAILURE: sig proteinurea may due to dn , htn, obesity   posiible atn vs ain   venkata wallis in progress   will get us kidney  due to poor dm controlled and diuresis    Serum creatinine is stable at 3.1   , approximating GFR is decreased   ml/min.   There is  progression. No uremic symptoms   No evidence of anemia.  Fluid status stable.  Will continue to avoid nephrotoxic drugs.  Patient remains asymptomatic.   Continue current therapy.  hold   diuretic.  hold  inhibitor.  hold   ARB.
follow  labs
iv fluids , nephro consult
follow  labs

## 2018-06-15 NOTE — PROGRESS NOTE ADULT - SUBJECTIVE AND OBJECTIVE BOX
[facility] Cardiology Progress Note [phone] (Dr. Washburn, Jayson, Teresita, Kimmie)    CHIEF COMPLAINT: Patient is a 68y old  Male who presents with a chief complaint of Increased Swelling in bilateral lower extremities (13 Jun 2018 16:19)      Follow Up Today: The patient denies any chest discomfort or shortness of breath.    HPI:  69 yo m ,  ,plus kids , lives with wife , works from home , has hx of hypertension , osteoarthritis , and hyperlipidemia , and ckd stage 3 , and DM T2, and hx of foot surgery , and chronic edema and lymphedema in legs , and presents with a few weeks of increasing edema and poor dm control and shortness of breath and now with inability to void , and increased weakness,   denies cp , fever, chills , nausea a, vomiting , head aches , (07 Jun 2018 21:33)      PAST MEDICAL & SURGICAL HISTORY:  BPH (benign prostatic hyperplasia)  Hyperlipemia  Hypertension  No significant past surgical history      MEDICATIONS  (STANDING):  amLODIPine   Tablet 10 milliGRAM(s) Oral daily  aspirin enteric coated 81 milliGRAM(s) Oral daily  atorvastatin 20 milliGRAM(s) Oral at bedtime  carvedilol 6.25 milliGRAM(s) Oral every 12 hours  cloNIDine Patch 0.1 mG/24Hr(s) 1 patch Topical every 7 days  dextrose 5%. 1000 milliLiter(s) (50 mL/Hr) IV Continuous <Continuous>  dextrose 50% Injectable 12.5 Gram(s) IV Push once  dextrose 50% Injectable 25 Gram(s) IV Push once  dextrose 50% Injectable 25 Gram(s) IV Push once  enoxaparin Injectable 40 milliGRAM(s) SubCutaneous daily  ergocalciferol 59441 Unit(s) Oral every week  hydrALAZINE 75 milliGRAM(s) Oral every 6 hours  insulin lispro (HumaLOG) corrective regimen sliding scale   SubCutaneous three times a day before meals  isosorbide   mononitrate ER Tablet (IMDUR) 60 milliGRAM(s) Oral daily  lactobacillus acidophilus 1 Tablet(s) Oral two times a day with meals  magnesium oxide 400 milliGRAM(s) Oral two times a day with meals  Nephro-jessica 1 Tablet(s) Oral daily  nystatin Powder 1 Application(s) Topical two times a day  potassium chloride    Tablet ER 10 milliEquivalent(s) Oral daily  torsemide 20 milliGRAM(s) Oral two times a day    MEDICATIONS  (PRN):  acetaminophen   Tablet. 650 milliGRAM(s) Oral every 6 hours PRN Mild Pain (1 - 3)  dextrose 40% Gel 15 Gram(s) Oral once PRN Blood Glucose LESS THAN 70 milliGRAM(s)/deciliter  glucagon  Injectable 1 milliGRAM(s) IntraMuscular once PRN Glucose LESS THAN 70 milligrams/deciliter      Allergies    adhesives (Rash)  No Known Drug Allergies    Intolerances        REVIEW OF SYSTEMS:    All other review of systems is negative unless indicated above    Vital Signs Last 24 Hrs  T(C): 36.8 (15 Alireza 2018 07:59), Max: 36.9 (14 Jun 2018 15:51)  T(F): 98.3 (15 Alireza 2018 07:59), Max: 98.5 (14 Jun 2018 23:22)  HR: 54 (15 Alireza 2018 07:59) (54 - 69)  BP: 150/72 (15 Alireza 2018 07:59) (140/65 - 180/82)  BP(mean): --  RR: 18 (15 Alireza 2018 07:59) (18 - 19)  SpO2: 96% (15 Alireza 2018 07:59) (95% - 96%)    I&O's Summary    14 Jun 2018 07:01  -  15 Alireza 2018 07:00  --------------------------------------------------------  IN: 1360 mL / OUT: 0 mL / NET: 1360 mL        PHYSICAL EXAM:    Constitutional: NAD, awake and alert, well-developed  Eyes:  EOMI,  Pupils round, No oral cyanosis.  HEENT: No exudate or erythema  Pulmonary: Non-labored, breath sounds are clear bilaterally, No wheezing, rales or rhonchi  Cardiovascular: Regular, S1 and S2, Systolic murmur  Gastrointestinal: Bowel Sounds present, soft, nontender.   Ext: No significant LE edema with good pulses x 4  Neurological: Alert, no gross focal motor deficits  Skin: No rashes.  Psych:  Mood & affect appropriate    LABS: All Labs Reviewed:                        11.2   5.97  )-----------( 203      ( 13 Jun 2018 06:09 )             35.2     14 Jun 2018 07:29    143    |  108    |  62     ----------------------------<  104    4.4     |  27     |  2.80   13 Jun 2018 06:09    143    |  108    |  59     ----------------------------<  104    4.3     |  25     |  2.70     Ca    8.4        14 Jun 2018 07:29  Ca    8.2        13 Jun 2018 06:09  Phos  3.0       13 Jun 2018 06:09  Mg     1.8       13 Jun 2018 06:09            Blood Culture:         RADIOLOGY/EKG:    Attending Attestation:   20 minutes spent on total encounter; more than 50% of the visit was spent counseling and/or coordinating care by the attending physician.     ASSESSMENT AND PLAN

## 2018-06-15 NOTE — PROGRESS NOTE ADULT - SUBJECTIVE AND OBJECTIVE BOX
Patient is a 68y Male whom presented to the hospital with ckd and venkata     pt seen and examined , no complains , no cp , no sob , pt would like to go home       PAST MEDICAL & SURGICAL HISTORY:  BPH (benign prostatic hyperplasia)  Hyperlipemia  Hypertension  No significant past surgical history      REVIEW OF SYSTEMS:    CONSTITUTIONAL: No weakness, fevers or chills  NECK: No pain or stiffness  RESPIRATORY: No cough, wheezing, hemoptysis; No shortness of breath  CARDIOVASCULAR: No chest pain or palpitations  GASTROINTESTINAL: No abdominal or epigastric pain. No nausea, vomiting,     No diarrhea or constipation. No melena                      -15    141  |  105  |  63<H>  ----------------------------<  134<H>  4.2   |  27  |  2.70<H>    Ca    8.7      15 Alireza 2018 09:14        CAPILLARY BLOOD GLUCOSE      POCT Blood Glucose.: 121 mg/dL (15 Alireza 2018 07:37)  POCT Blood Glucose.: 165 mg/dL (2018 21:40)  POCT Blood Glucose.: 113 mg/dL (2018 16:30)  POCT Blood Glucose.: 113 mg/dL (2018 11:27)      Vital Signs Last 24 Hrs  T(C): 36.8 (15 Alireza 2018 07:59), Max: 36.9 (2018 15:51)  T(F): 98.3 (15 Alireza 2018 07:59), Max: 98.5 (2018 23:22)  HR: 54 (15 Alireza 2018 07:59) (54 - 69)  BP: 150/72 (15 Alireza 2018 07:59) (140/65 - 180/82)  BP(mean): --  RR: 18 (15 Alireza 2018 07:59) (18 - 19)  SpO2: 96% (15 Alireza 2018 07:59) (95% - 96%)    Urinalysis Basic - ( 2018 07:21 )    Color: Yellow / Appearance: Clear / S.010 / pH: x  Gluc: x / Ketone: Negative  / Bili: Negative / Urobili: Negative   Blood: x / Protein: 500 mg/dL / Nitrite: Negative   Leuk Esterase: Negative / RBC: 0-2 /HPF / WBC 3-5   Sq Epi: x / Non Sq Epi: Occasional / Bacteria: Negative                                                      PHYSICAL EXAM:    Constitutional: NAD  HEENT: conjunctive   clear   Neck:  No JVD  Respiratory: CTAB  Cardiovascular: S1 and S2  Gastrointestinal: BS+, soft, NT/ND  Extremities: pos 3 plus  peripheral edema  Neurological: A/O x 3, no focal deficits  Psychiatric: Normal mood, normal affect  : No Sidhu  Skin: No rashes  Access: Not applicable

## 2018-06-15 NOTE — PROGRESS NOTE ADULT - PROBLEM SELECTOR PROBLEM 2
Hyperkalemia
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

## 2018-06-15 NOTE — PROGRESS NOTE ADULT - PROBLEM SELECTOR PROBLEM 7
Renovascular hypertension

## 2018-06-15 NOTE — PROGRESS NOTE ADULT - PROBLEM SELECTOR PLAN 3
BP monitoring,continue current antihypertensive meds, low salt diet, weight reduction
P monitoring,continue current antihypertensive meds, low salt diet,followup with PMD in 1-2 weeks.
BP monitoring,continue current antihypertensive meds, low salt diet, weight reduction
P monitoring,continue current antihypertensive meds, low salt diet,followup with PMD in 1-2 weeks.
continue meds

## 2018-06-15 NOTE — PROGRESS NOTE ADULT - ASSESSMENT
· Assessment		  69 yo m ,  ,plus kids , lives with wife , works from home , has hx of hypertension , osteoarthritis , and hyperlipidemia , and ckd stage 3 , and DM T2, and hx of foot surgery , and chronic edema and lymphedema in legs , and presents with a few weeks of increasing edema and poor dm control and shortness of breath and now with inability to void , and increased weakness,   denies cp , fever, chills , nausea a, vomiting , head aches ,   patient admitted for CINDY on CKD , and hyperkalemia , and diabetes mellitus type 2 , and edema and fluid retention , on lasix , nephro to follow ,   on IV abx for ? uti ,   low sugar this am , got glucose , , will adjust insulin  on 6/15/18 dc iv abx , f/u labs , f/u labs , diet exs, ? nephrotic syndrome adjust meds , bp still high add clonidine     continue aggressive diuresis , and follow labs , and weight      Problem/Plan - 1:  ·  Problem: Acute renal failure, unspecified acute renal failure type.  Plan: iv fluids , nephro consult.      Problem/Plan - 2:  ·  Problem: Hyperkalemia.      Problem/Plan - 3:  ·  Problem: Hypervolemia, unspecified hypervolemia type.      Problem/Plan - 4:  ·  Problem: Urinary tract infection without hematuria, site unspecified.      Problem/Plan - 5:  ·  Problem: BPH (benign prostatic hyperplasia).      Problem/Plan - 6:  Problem: Mixed hyperlipidemia.     Problem/Plan - 7:  ·  Problem: Renovascular hypertension.  Plan: bp meds.      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  Plan: fs coverage.      Problem/Plan - 9:  ·  Problem: Neuropathy due to type 2 diabetes mellitus.     Attending Attestation:   I was physically present for the key portions of the evaluation and management (E/M) service provided.  I agree with the above history, physical, and plan which I have reviewed and edited where appropriate.    Assessment and Plan:   · Assessment		  69 yo m ,  ,plus kids , lives with wife , works from home , has hx of hypertension , osteoarthritis , and hyperlipidemia , and ckd stage 3 , and DM T2, and hx of foot surgery , and chronic edema and lymphedema in legs , and presents with a few weeks of increasing edema and poor dm control and shortness of breath and now with inability to void , and increased weakness, now with cindy and ckd          Problem/Plan - 1:  ·  Problem: Acute renal failure, unspecified acute renal failure type.  Plan: Acute renal failure, unspecified acute renal failure type.    sig proteinurea may due to dm , htn, obesity   posiible atn vs ain   fluid overload continue with lasix 40 mg iv q 12 , gfr is improved with diuresis   Serum creatinine is stable at 2.7   , approximating GFR is decreased   ml/min.   There is  progression. No uremic symptoms   No evidence of anemia.  Fluid status stable.  Will continue to avoid nephrotoxic drugs.  Patient remains asymptomatic.   Continue current therapy.  hold  inhibitor. hold   ARB.      Problem/Plan - 2:  ·  Problem: Hyperkalemia.  Plan: is controlled ,      Problem/Plan - 3:  ·  Problem: Hypertension.  Plan: P monitoring, continue current antihypertensive meds, low salt diet, followup with PMD in 1-2 weeks.      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  Plan: fs coverage.       Electronic Signatures:  Pahlavan,Mohsen (MD)  (Signed 13-Jun-2018 11:59)  	Authored: Progress Note, Subjective and Objective, Assessment and Plan

## 2018-06-15 NOTE — PROGRESS NOTE ADULT - PROBLEM SELECTOR PLAN 2
is controlled , will f/u with bmp
is controlled ,
follow bs
is controlled
is controlled ,
kayexalate 30 gram po x 1. prn for k is > 5.5
follow bs

## 2018-06-15 NOTE — PROGRESS NOTE ADULT - PROBLEM SELECTOR PROBLEM 4
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
Urinary tract infection without hematuria, site unspecified
Urinary tract infection without hematuria, site unspecified
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
Urinary tract infection without hematuria, site unspecified

## 2018-06-15 NOTE — PROGRESS NOTE ADULT - SUBJECTIVE AND OBJECTIVE BOX
PCP  Subjective:   in bed , wants to go home , feels fine , has been ambulating , educated patient on nephrotic syndrome and proteinuria and venkata and ckd     Objective:   Vital Signs Last 24 Hrs  T(C): 36.7 (06-15-18 @ 04:47), Max: 36.9 (18 @ 15:51)  T(F): 98.1 (06-15-18 @ 04:47), Max: 98.5 (18 @ 23:22)  HR: 69 (06-15-18 @ 04:47) (56 - 69)  BP: 164/70 (06-15-18 @ 04:47) (140/65 - 180/82)  BP(mean): --  RR: 19 (06-15-18 @ 04:47) (18 - 19)  SpO2: 95% (06-15-18 @ 04:47) (95% - 96%)  Daily     Daily Weight in k.9 (15 Alireza 2018 04:47)    GENERAL:  wdwn obese male , awake and alert , responsive ,   EYES: eomi jose  NECK: supple no masses  CHEST/LUNG: clear , some poor air movement in bases  HEART: s1 s2 regular ,   ABDOMEN:  obese , large panus , and rash under belly non tender , lymphedema in abdominal wall   EXTREMITIES:  edema pitting bilateral , overall edema is decreasing some what    SKIN:  some hyperpigmentation bilateral legs ,   CNS:  awake , alert non focal , ambulates, responsive ,   MSK: FROM in joints and no swelling         CNS:      Allergies: Allergies    adhesives (Rash)  No Known Drug Allergies    Intolerances        Home Medications:  acetaminophen 325 mg oral tablet: 2 tab(s) orally every 6 hours, As needed, Mild Pain (1 - 3) (2018 16:21)  amLODIPine 5 mg oral tablet: 1 tab(s) orally once a day (2018 21:35)  aspirin 81 mg oral tablet: 1 tab(s) orally once a day (2018 18:32)  atorvastatin 20 mg oral tablet: 1 tab(s) orally once a day (at bedtime) (2018 16:21)  hydrALAZINE 50 mg oral tablet: 1 tab(s) orally every 6 hours (2018 16:21)  Lantus:  subcutaneous  (2018 18:32)  magnesium oxide 400 mg (241.3 mg elemental magnesium) oral tablet: 1 tab(s) orally 2 times a day (with meals) (2018 16:21)  multivitamin:  (2018 16:21)  nystatin 100,000 units/g topical powder: 1 application topically 2 times a day (2018 16:21)  tamsulosin 0.4 mg oral capsule: 1 cap(s) orally once a day (2018 18:32)    Medications:   acetaminophen   Tablet. 650 milliGRAM(s) Oral every 6 hours PRN  amLODIPine   Tablet 10 milliGRAM(s) Oral daily  aspirin enteric coated 81 milliGRAM(s) Oral daily  atorvastatin 20 milliGRAM(s) Oral at bedtime  carvedilol 6.25 milliGRAM(s) Oral every 12 hours  cloNIDine Patch 0.1 mG/24Hr(s) 1 patch Topical every 7 days  dextrose 40% Gel 15 Gram(s) Oral once PRN  dextrose 5%. 1000 milliLiter(s) IV Continuous <Continuous>  dextrose 50% Injectable 12.5 Gram(s) IV Push once  dextrose 50% Injectable 25 Gram(s) IV Push once  dextrose 50% Injectable 25 Gram(s) IV Push once  enoxaparin Injectable 40 milliGRAM(s) SubCutaneous daily  ergocalciferol 70257 Unit(s) Oral every week  glucagon  Injectable 1 milliGRAM(s) IntraMuscular once PRN  hydrALAZINE 75 milliGRAM(s) Oral every 6 hours  insulin lispro (HumaLOG) corrective regimen sliding scale   SubCutaneous three times a day before meals  isosorbide   mononitrate ER Tablet (IMDUR) 60 milliGRAM(s) Oral daily  lactobacillus acidophilus 1 Tablet(s) Oral two times a day with meals  magnesium oxide 400 milliGRAM(s) Oral two times a day with meals  Nephro-jessica 1 Tablet(s) Oral daily  nystatin Powder 1 Application(s) Topical two times a day  potassium chloride    Tablet ER 10 milliEquivalent(s) Oral daily  torsemide 20 milliGRAM(s) Oral two times a day      LABS:        143  |  108  |  62<H>  ----------------------------<  104<H>  4.4   |  27  |  2.80<H>    Ca    8.4<L>      2018 07:29          Urinalysis Basic - ( 2018 07:21 )    Color: Yellow / Appearance: Clear / S.010 / pH: x  Gluc: x / Ketone: Negative  / Bili: Negative / Urobili: Negative   Blood: x / Protein: 500 mg/dL / Nitrite: Negative   Leuk Esterase: Negative / RBC: 0-2 /HPF / WBC 3-5   Sq Epi: x / Non Sq Epi: Occasional / Bacteria: Negative        CAPILLARY BLOOD GLUCOSE      POCT Blood Glucose.: 165 mg/dL (2018 21:40)  POCT Blood Glucose.: 113 mg/dL (2018 16:30)  POCT Blood Glucose.: 113 mg/dL (2018 11:27)  POCT Blood Glucose.: 108 mg/dL (2018 07:27)          RECENT CULTURES:

## 2018-06-15 NOTE — PROGRESS NOTE ADULT - PROBLEM SELECTOR PLAN 4
fs coverage
fs coverage
abx
fs coverage
with long-term current use of insulin.   will check hga1c.
with long-term current use of insulin.   will check hga1c.
abx

## 2018-06-15 NOTE — PROGRESS NOTE ADULT - PROBLEM SELECTOR PROBLEM 9
Neuropathy due to type 2 diabetes mellitus

## 2018-08-06 NOTE — PHYSICAL THERAPY INITIAL EVALUATION ADULT - BALANCE DISTURBANCE, IDENTIFIED IMPAIRMENT CONTRIBUTE, REHAB EVAL
Anesthesia Post-op Note      Patient: Minnie Lee  ANESTHESIA:  General   ANESTHESIOLOGIST:  Anesthesiologist: Sylvain Vanegas MD; Adilson Troncoso MD      Vital Last Value   Temperature 36.8 °C (98.2 °F) (08/06/18 1407)   Pulse 92 (08/06/18 1407)   Respiratory 28 (08/06/18 1407)   Non-Invasive  Blood Pressure (!) 148/92 (08/06/18 1407)   Arterial   Blood Pressure     Pulse Oximetry 90 % (08/06/18 1407)       Post-op vital signs: stable.  Level of Consciousness: participates in exam, answers questions appropriately, awake and oriented.  Respiratory: unassisted, spontaneous ventilation, face mask  Cardiovascular: stable and blood pressure at baseline  Hydration: euvolemic  Post-op pain: well controlled  Nausea: None  Airway patency: patent  Post-op assessment: No apparent anesthetic complications.  Complications: None.    Comments:    impaired coordination/decreased strength/decreased ROM

## 2023-11-03 NOTE — ED PROVIDER NOTE - NS ED NOTE AC HIGH RISK COUNTRIES
Problem: Discharge Planning  Goal: Discharge to home or other facility with appropriate resources  Outcome: Progressing  Flowsheets  Taken 11/2/2023 1945 by Luis Vanessa RN  Discharge to home or other facility with appropriate resources:   Identify barriers to discharge with patient and caregiver   Arrange for needed discharge resources and transportation as appropriate   Identify discharge learning needs (meds, wound care, etc)  Taken 11/2/2023 1652 by Dionisio Aragon RN  Discharge to home or other facility with appropriate resources: Identify barriers to discharge with patient and caregiver     Problem: Pain  Goal: Verbalizes/displays adequate comfort level or baseline comfort level  Outcome: Progressing     Problem: Safety - Adult  Goal: Free from fall injury  Outcome: Progressing     Problem: ABCDS Injury Assessment  Goal: Absence of physical injury  Outcome: Progressing     Problem: Chronic Conditions and Co-morbidities  Goal: Patient's chronic conditions and co-morbidity symptoms are monitored and maintained or improved  Outcome: Progressing  Flowsheets  Taken 11/2/2023 1945 by Walter Rivera - Patient's Chronic Conditions and Co-Morbidity Symptoms are Monitored and Maintained or Improved:   Monitor and assess patient's chronic conditions and comorbid symptoms for stability, deterioration, or improvement   Collaborate with multidisciplinary team to address chronic and comorbid conditions and prevent exacerbation or deterioration  Taken 11/2/2023 1652 by Walter Collins - Patient's Chronic Conditions and Co-Morbidity Symptoms are Monitored and Maintained or Improved: Monitor and assess patient's chronic conditions and comorbid symptoms for stability, deterioration, or improvement No